# Patient Record
Sex: FEMALE | Race: WHITE | NOT HISPANIC OR LATINO | Employment: FULL TIME | URBAN - METROPOLITAN AREA
[De-identification: names, ages, dates, MRNs, and addresses within clinical notes are randomized per-mention and may not be internally consistent; named-entity substitution may affect disease eponyms.]

---

## 2019-04-05 ENCOUNTER — HOSPITAL ENCOUNTER (OUTPATIENT)
Dept: OTHER | Facility: HOSPITAL | Age: 29
Discharge: HOME | End: 2019-04-05

## 2019-04-05 LAB
ALBUMIN SERPL-MCNC: 4.7 G/DL (ref 3.5–5)
ALBUMIN/GLOB SERPL: 1.3 {RATIO} (ref 1.4–2.6)
ALP SERPL-CCNC: 287 U/L (ref 42–98)
ALT SERPL-CCNC: 36 U/L (ref 10–40)
ANION GAP SERPL CALC-SCNC: 18 MMOL/L (ref 8–19)
AST SERPL-CCNC: 29 U/L (ref 15–50)
BASOPHILS # BLD AUTO: 0.04 10*3/UL (ref 0–0.2)
BASOPHILS NFR BLD AUTO: 0.8 % (ref 0–3)
BILIRUB SERPL-MCNC: 0.56 MG/DL (ref 0.2–1.3)
BUN SERPL-MCNC: 18 MG/DL (ref 5–25)
BUN/CREAT SERPL: 28 {RATIO} (ref 6–20)
CALCIUM SERPL-MCNC: 10 MG/DL (ref 8.7–10.4)
CHLORIDE SERPL-SCNC: 102 MMOL/L (ref 99–111)
CONV ABS IMM GRAN: 0.01 10*3/UL (ref 0–0.2)
CONV CO2: 26 MMOL/L (ref 22–32)
CONV IMMATURE GRAN: 0.2 % (ref 0–1.8)
CONV TOTAL PROTEIN: 8.4 G/DL (ref 6.3–8.2)
CREAT UR-MCNC: 0.65 MG/DL (ref 0.5–0.9)
DEPRECATED RDW RBC AUTO: 48.6 FL (ref 36.4–46.3)
EOSINOPHIL # BLD AUTO: 0.1 10*3/UL (ref 0–0.7)
EOSINOPHIL # BLD AUTO: 1.9 % (ref 0–7)
ERYTHROCYTE [DISTWIDTH] IN BLOOD BY AUTOMATED COUNT: 14.3 % (ref 11.7–14.4)
GFR SERPLBLD BASED ON 1.73 SQ M-ARVRAT: >60 ML/MIN/{1.73_M2}
GLOBULIN UR ELPH-MCNC: 3.7 G/DL (ref 2–3.5)
GLUCOSE SERPL-MCNC: 100 MG/DL (ref 65–99)
HBA1C MFR BLD: 11.5 G/DL (ref 12–16)
HCT VFR BLD AUTO: 37.1 % (ref 37–47)
LYMPHOCYTES # BLD AUTO: 1.17 10*3/UL (ref 1–5)
MCH RBC QN AUTO: 28.5 PG (ref 27–31)
MCHC RBC AUTO-ENTMCNC: 31 G/DL (ref 33–37)
MCV RBC AUTO: 91.8 FL (ref 81–99)
MONOCYTES # BLD AUTO: 0.46 10*3/UL (ref 0.2–1.2)
MONOCYTES NFR BLD AUTO: 8.8 % (ref 3–10)
NEUTROPHILS # BLD AUTO: 3.46 10*3/UL (ref 2–8)
NEUTROPHILS NFR BLD AUTO: 66 % (ref 30–85)
NRBC CBCN: 0 % (ref 0–0.7)
OSMOLALITY SERPL CALC.SUM OF ELEC: 296 MOSM/KG (ref 273–304)
PLATELET # BLD AUTO: 373 10*3/UL (ref 130–400)
PMV BLD AUTO: 9.1 FL (ref 9.4–12.3)
POTASSIUM SERPL-SCNC: 4.1 MMOL/L (ref 3.5–5.3)
RBC # BLD AUTO: 4.04 10*6/UL (ref 4.2–5.4)
SODIUM SERPL-SCNC: 142 MMOL/L (ref 135–147)
VARIANT LYMPHS NFR BLD MANUAL: 22.3 % (ref 20–45)
WBC # BLD AUTO: 5.24 10*3/UL (ref 4.8–10.8)

## 2019-07-30 ENCOUNTER — HOSPITAL ENCOUNTER (OUTPATIENT)
Dept: FAMILY MEDICINE CLINIC | Facility: CLINIC | Age: 29
Discharge: HOME | End: 2019-07-30
Attending: NURSE PRACTITIONER

## 2019-07-30 ENCOUNTER — OFFICE VISIT CONVERTED (OUTPATIENT)
Dept: FAMILY MEDICINE CLINIC | Facility: CLINIC | Age: 29
End: 2019-07-30
Attending: NURSE PRACTITIONER

## 2019-07-30 ENCOUNTER — HOSPITAL ENCOUNTER (OUTPATIENT)
Dept: GENERAL RADIOLOGY | Facility: HOSPITAL | Age: 29
Discharge: HOME | End: 2019-07-30
Attending: NURSE PRACTITIONER

## 2019-07-30 LAB
25(OH)D3 SERPL-MCNC: 22 NG/ML (ref 30–100)
ALBUMIN SERPL-MCNC: 4.6 G/DL (ref 3.5–5)
ALBUMIN/GLOB SERPL: 1.1 {RATIO} (ref 1.4–2.6)
ALP SERPL-CCNC: 286 U/L (ref 42–98)
ALT SERPL-CCNC: 42 U/L (ref 10–40)
ANION GAP SERPL CALC-SCNC: 24 MMOL/L (ref 8–19)
AST SERPL-CCNC: 37 U/L (ref 15–50)
BASOPHILS # BLD AUTO: 0.05 10*3/UL (ref 0–0.2)
BASOPHILS NFR BLD AUTO: 0.7 % (ref 0–3)
BILIRUB SERPL-MCNC: 0.67 MG/DL (ref 0.2–1.3)
BUN SERPL-MCNC: 13 MG/DL (ref 5–25)
BUN/CREAT SERPL: 20 {RATIO} (ref 6–20)
CALCIUM SERPL-MCNC: 10.2 MG/DL (ref 8.7–10.4)
CHLORIDE SERPL-SCNC: 99 MMOL/L (ref 99–111)
CHOLEST SERPL-MCNC: 249 MG/DL (ref 107–200)
CHOLEST/HDLC SERPL: 4.7 {RATIO} (ref 3–6)
CONV ABS IMM GRAN: 0.01 10*3/UL (ref 0–0.2)
CONV CO2: 23 MMOL/L (ref 22–32)
CONV IMMATURE GRAN: 0.1 % (ref 0–1.8)
CONV TOTAL PROTEIN: 8.7 G/DL (ref 6.3–8.2)
CREAT UR-MCNC: 0.64 MG/DL (ref 0.5–0.9)
DEPRECATED RDW RBC AUTO: 46.7 FL (ref 36.4–46.3)
EOSINOPHIL # BLD AUTO: 0.11 10*3/UL (ref 0–0.7)
EOSINOPHIL # BLD AUTO: 1.6 % (ref 0–7)
ERYTHROCYTE [DISTWIDTH] IN BLOOD BY AUTOMATED COUNT: 13.8 % (ref 11.7–14.4)
FOLATE SERPL-MCNC: >20 NG/ML (ref 4.8–20)
GFR SERPLBLD BASED ON 1.73 SQ M-ARVRAT: >60 ML/MIN/{1.73_M2}
GLOBULIN UR ELPH-MCNC: 4.1 G/DL (ref 2–3.5)
GLUCOSE SERPL-MCNC: 92 MG/DL (ref 65–99)
HBA1C MFR BLD: 11.8 G/DL (ref 12–16)
HCT VFR BLD AUTO: 37.9 % (ref 37–47)
HDLC SERPL-MCNC: 53 MG/DL (ref 40–60)
IRON SATN MFR SERPL: 26 % (ref 20–55)
IRON SERPL-MCNC: 109 UG/DL (ref 60–170)
LDLC SERPL CALC-MCNC: 161 MG/DL (ref 70–100)
LYMPHOCYTES # BLD AUTO: 1.51 10*3/UL (ref 1–5)
MCH RBC QN AUTO: 28.7 PG (ref 27–31)
MCHC RBC AUTO-ENTMCNC: 31.1 G/DL (ref 33–37)
MCV RBC AUTO: 92.2 FL (ref 81–99)
MONOCYTES # BLD AUTO: 0.43 10*3/UL (ref 0.2–1.2)
MONOCYTES NFR BLD AUTO: 6.4 % (ref 3–10)
NEUTROPHILS # BLD AUTO: 4.56 10*3/UL (ref 2–8)
NEUTROPHILS NFR BLD AUTO: 68.6 % (ref 30–85)
NRBC CBCN: 0 % (ref 0–0.7)
OSMOLALITY SERPL CALC.SUM OF ELEC: 294 MOSM/KG (ref 273–304)
PLATELET # BLD AUTO: 366 10*3/UL (ref 130–400)
PMV BLD AUTO: 9.6 FL (ref 9.4–12.3)
POTASSIUM SERPL-SCNC: 4.1 MMOL/L (ref 3.5–5.3)
RBC # BLD AUTO: 4.11 10*6/UL (ref 4.2–5.4)
SODIUM SERPL-SCNC: 142 MMOL/L (ref 135–147)
TIBC SERPL-MCNC: 419 UG/DL (ref 245–450)
TRANSFERRIN SERPL-MCNC: 293 MG/DL (ref 250–380)
TRIGL SERPL-MCNC: 173 MG/DL (ref 40–150)
TSH SERPL-ACNC: 2.82 M[IU]/L (ref 0.27–4.2)
VARIANT LYMPHS NFR BLD MANUAL: 22.6 % (ref 20–45)
VIT B12 SERPL-MCNC: 438 PG/ML (ref 211–911)
VLDLC SERPL-MCNC: 35 MG/DL (ref 5–37)
WBC # BLD AUTO: 6.67 10*3/UL (ref 4.8–10.8)

## 2019-08-14 ENCOUNTER — OFFICE VISIT CONVERTED (OUTPATIENT)
Dept: FAMILY MEDICINE CLINIC | Facility: CLINIC | Age: 29
End: 2019-08-14
Attending: NURSE PRACTITIONER

## 2019-08-14 ENCOUNTER — HOSPITAL ENCOUNTER (OUTPATIENT)
Dept: FAMILY MEDICINE CLINIC | Facility: CLINIC | Age: 29
Discharge: HOME | End: 2019-08-14
Attending: NURSE PRACTITIONER

## 2019-08-15 ENCOUNTER — HOSPITAL ENCOUNTER (OUTPATIENT)
Dept: PHYSICAL THERAPY | Facility: CLINIC | Age: 29
Setting detail: RECURRING SERIES
Discharge: HOME | End: 2019-09-27
Attending: NURSE PRACTITIONER

## 2019-08-16 LAB
CONV LAST MENSTURAL PERIOD: NORMAL
SPECIMEN SOURCE: NORMAL
SPECIMEN SOURCE: NORMAL
THIN PREP CVX: NORMAL

## 2019-08-20 ENCOUNTER — HOSPITAL ENCOUNTER (OUTPATIENT)
Dept: MRI IMAGING | Facility: HOSPITAL | Age: 29
Discharge: HOME | End: 2019-08-20
Attending: NURSE PRACTITIONER

## 2019-10-17 ENCOUNTER — HOSPITAL ENCOUNTER (OUTPATIENT)
Dept: PHYSICAL THERAPY | Facility: CLINIC | Age: 29
Setting detail: RECURRING SERIES
Discharge: HOME | End: 2020-01-22
Attending: ORTHOPAEDIC SURGERY

## 2020-06-08 ENCOUNTER — HOSPITAL ENCOUNTER (OUTPATIENT)
Dept: PHYSICAL THERAPY | Facility: CLINIC | Age: 30
Setting detail: RECURRING SERIES
Discharge: STILL ADMITTED OR ADMINISTRATIVE | End: 2020-09-16
Attending: ORTHOPAEDIC SURGERY

## 2020-07-06 ENCOUNTER — HOSPITAL ENCOUNTER (OUTPATIENT)
Dept: OTHER | Facility: HOSPITAL | Age: 30
Discharge: HOME | End: 2020-07-06

## 2020-07-08 LAB
CONV MUMPS ANTIBODY IGG: <9 AU/ML
HBV SURFACE AB SER QL: REACTIVE
MEV IGG SER IA-ACNC: <13.5 AU/ML
RUBV IGG SER-ACNC: 2.88 [IU]/ML
VZV IGG SER IA-ACNC: 332 INDEX

## 2020-09-21 ENCOUNTER — HOSPITAL ENCOUNTER (OUTPATIENT)
Dept: PHYSICAL THERAPY | Facility: CLINIC | Age: 30
Setting detail: RECURRING SERIES
Discharge: HOME | End: 2020-11-03
Attending: ORTHOPAEDIC SURGERY

## 2021-05-15 VITALS
TEMPERATURE: 98.4 F | DIASTOLIC BLOOD PRESSURE: 80 MMHG | SYSTOLIC BLOOD PRESSURE: 123 MMHG | OXYGEN SATURATION: 100 % | HEART RATE: 91 BPM | BODY MASS INDEX: 22.68 KG/M2 | RESPIRATION RATE: 18 BRPM | HEIGHT: 63 IN | WEIGHT: 128 LBS

## 2021-05-15 VITALS
HEIGHT: 63 IN | RESPIRATION RATE: 18 BRPM | OXYGEN SATURATION: 100 % | HEART RATE: 66 BPM | SYSTOLIC BLOOD PRESSURE: 105 MMHG | BODY MASS INDEX: 22.68 KG/M2 | WEIGHT: 128 LBS | TEMPERATURE: 97.6 F | DIASTOLIC BLOOD PRESSURE: 75 MMHG

## 2022-01-06 ENCOUNTER — OFFICE VISIT (OUTPATIENT)
Dept: FAMILY MEDICINE CLINIC | Facility: CLINIC | Age: 32
End: 2022-01-06

## 2022-01-06 VITALS
BODY MASS INDEX: 23.74 KG/M2 | SYSTOLIC BLOOD PRESSURE: 134 MMHG | WEIGHT: 134 LBS | DIASTOLIC BLOOD PRESSURE: 80 MMHG | HEIGHT: 63 IN | HEART RATE: 90 BPM | TEMPERATURE: 98.6 F | OXYGEN SATURATION: 99 %

## 2022-01-06 DIAGNOSIS — Z00.00 ANNUAL PHYSICAL EXAM: ICD-10-CM

## 2022-01-06 DIAGNOSIS — E78.2 MIXED HYPERLIPIDEMIA: ICD-10-CM

## 2022-01-06 DIAGNOSIS — R73.01 IMPAIRED FASTING GLUCOSE: Primary | ICD-10-CM

## 2022-01-06 DIAGNOSIS — Z13.29 SCREENING FOR THYROID DISORDER: ICD-10-CM

## 2022-01-06 PROBLEM — C81.90 HODGKIN LYMPHOMA: Status: ACTIVE | Noted: 2022-01-06

## 2022-01-06 LAB
ALBUMIN SERPL-MCNC: 5 G/DL (ref 3.5–5.2)
ALBUMIN/GLOB SERPL: 1.6 G/DL
ALP SERPL-CCNC: 235 U/L (ref 39–117)
ALT SERPL W P-5'-P-CCNC: 62 U/L (ref 1–33)
ANION GAP SERPL CALCULATED.3IONS-SCNC: 10.5 MMOL/L (ref 5–15)
AST SERPL-CCNC: 42 U/L (ref 1–32)
BASOPHILS # BLD AUTO: 0.06 10*3/MM3 (ref 0–0.2)
BASOPHILS NFR BLD AUTO: 0.5 % (ref 0–1.5)
BILIRUB SERPL-MCNC: 0.2 MG/DL (ref 0–1.2)
BUN SERPL-MCNC: 14 MG/DL (ref 6–20)
BUN/CREAT SERPL: 18.7 (ref 7–25)
CALCIUM SPEC-SCNC: 9.9 MG/DL (ref 8.6–10.5)
CHLORIDE SERPL-SCNC: 100 MMOL/L (ref 98–107)
CHOLEST SERPL-MCNC: 244 MG/DL (ref 0–200)
CO2 SERPL-SCNC: 26.5 MMOL/L (ref 22–29)
CREAT SERPL-MCNC: 0.75 MG/DL (ref 0.57–1)
DEPRECATED RDW RBC AUTO: 45.4 FL (ref 37–54)
EOSINOPHIL # BLD AUTO: 0.08 10*3/MM3 (ref 0–0.4)
EOSINOPHIL NFR BLD AUTO: 0.7 % (ref 0.3–6.2)
ERYTHROCYTE [DISTWIDTH] IN BLOOD BY AUTOMATED COUNT: 13.2 % (ref 12.3–15.4)
GFR SERPL CREATININE-BSD FRML MDRD: 90 ML/MIN/1.73
GLOBULIN UR ELPH-MCNC: 3.2 GM/DL
GLUCOSE SERPL-MCNC: 101 MG/DL (ref 65–99)
HBA1C MFR BLD: 5.69 % (ref 4.8–5.6)
HCT VFR BLD AUTO: 39.6 % (ref 34–46.6)
HDLC SERPL-MCNC: 59 MG/DL (ref 40–60)
HGB BLD-MCNC: 12.6 G/DL (ref 12–15.9)
IMM GRANULOCYTES # BLD AUTO: 0.03 10*3/MM3 (ref 0–0.05)
IMM GRANULOCYTES NFR BLD AUTO: 0.3 % (ref 0–0.5)
LDLC SERPL CALC-MCNC: 160 MG/DL (ref 0–100)
LDLC/HDLC SERPL: 2.67 {RATIO}
LYMPHOCYTES # BLD AUTO: 1.61 10*3/MM3 (ref 0.7–3.1)
LYMPHOCYTES NFR BLD AUTO: 13.4 % (ref 19.6–45.3)
MCH RBC QN AUTO: 29.5 PG (ref 26.6–33)
MCHC RBC AUTO-ENTMCNC: 31.8 G/DL (ref 31.5–35.7)
MCV RBC AUTO: 92.7 FL (ref 79–97)
MONOCYTES # BLD AUTO: 0.87 10*3/MM3 (ref 0.1–0.9)
MONOCYTES NFR BLD AUTO: 7.3 % (ref 5–12)
NEUTROPHILS NFR BLD AUTO: 77.8 % (ref 42.7–76)
NEUTROPHILS NFR BLD AUTO: 9.33 10*3/MM3 (ref 1.7–7)
NRBC BLD AUTO-RTO: 0 /100 WBC (ref 0–0.2)
PLATELET # BLD AUTO: 385 10*3/MM3 (ref 140–450)
PMV BLD AUTO: 9.6 FL (ref 6–12)
POTASSIUM SERPL-SCNC: 4.2 MMOL/L (ref 3.5–5.2)
PROT SERPL-MCNC: 8.2 G/DL (ref 6–8.5)
RBC # BLD AUTO: 4.27 10*6/MM3 (ref 3.77–5.28)
SODIUM SERPL-SCNC: 137 MMOL/L (ref 136–145)
TRIGL SERPL-MCNC: 137 MG/DL (ref 0–150)
TSH SERPL DL<=0.05 MIU/L-ACNC: 2.66 UIU/ML (ref 0.27–4.2)
VLDLC SERPL-MCNC: 25 MG/DL (ref 5–40)
WBC NRBC COR # BLD: 11.98 10*3/MM3 (ref 3.4–10.8)

## 2022-01-06 PROCEDURE — 99385 PREV VISIT NEW AGE 18-39: CPT | Performed by: NURSE PRACTITIONER

## 2022-01-06 PROCEDURE — 80061 LIPID PANEL: CPT | Performed by: NURSE PRACTITIONER

## 2022-01-06 PROCEDURE — 80050 GENERAL HEALTH PANEL: CPT | Performed by: NURSE PRACTITIONER

## 2022-01-06 PROCEDURE — 83036 HEMOGLOBIN GLYCOSYLATED A1C: CPT | Performed by: NURSE PRACTITIONER

## 2022-01-06 NOTE — PATIENT INSTRUCTIONS
Immunization Schedule, 27-49 Years Old    Vaccines are usually given at various ages, according to a schedule. Your health care provider will recommend vaccines for you based on your age, medical history, and lifestyle or other factors, such as travel or where you work.  You may receive vaccines as individual doses or as more than one vaccine together in one shot (combination vaccines). Talk with your health care provider about the risks and benefits of combination vaccines.  Recommended immunizations for 27-49 years old  Influenza vaccine  · You should get a dose of the influenza vaccine every year.  Tetanus, diphtheria, and pertussis vaccine  A vaccine that protects against tetanus, diphtheria, and pertussis is known as the Tdap vaccine. A vaccine that protects against tetanus and diphtheria is known as the Td vaccine.  · You should only get the Td vaccine if you have had at least 1 dose of the Tdap vaccine.  · You should get 1 dose of the Td or Tdap vaccine every 10 years, or you should get 1 dose of the Tdap vaccine if:  ? You have not previously gotten a Tdap vaccine.  ? You do not know if you have ever gotten a Tdap vaccine.  ? You are between 27 and 36 weeks pregnant.  Measles, mumps, and rubella vaccine  This is also known as the MMR vaccine. You may need to get the MMR vaccine if:  · You need to catch up on doses you missed in the past.  · You have not been given the vaccine before.  · You do not have evidence of immunity (by a blood test).  Pregnant women should not get the MMR vaccine during pregnancy because it may be harmful to the unborn baby. However, if you are not immune to measles, mumps, or rubella, you should get a dose of MMR vaccine one month or more before pregnancy or within days after delivery.  Varicella vaccine  This is also known as the NILTON vaccine. You may need to get the NILTON vaccine if you were born in 1980 or later and:  · You need to catch up on doses you missed in the past.  · You  have not been given the vaccine before.  · You do not have evidence of immunity (by a blood test).  · You have certain high-risk conditions, such as HIV or AIDS.  Pregnant women should not get the NILTON vaccine during pregnancy because it may be harmful to the unborn baby. However, if you are not immune to chickenpox (varicella), you should get a dose of the NILTON vaccine within days after delivery.  Human papillomavirus vaccine  This is also known as the HPV vaccine. If you have not gotten the vaccine before or you missed doses in the past, talk to your health care provider about whether it is appropriate for you to get the HPV vaccine.  Pneumococcal conjugate vaccine  This is also known as the PCV13 vaccine. You should get the PCV13 vaccine as recommended if you have certain high-risk conditions. These include:  · Diabetes.  · Chronic conditions of the heart, lungs, or liver.  · Conditions that affect the body's disease-fighting system (immune system).  Pneumococcal polysaccharide vaccine  This is also known as the PPSV23 vaccine. You should get the PPSV23 vaccine as recommended if you have certain high-risk conditions. These include:  · Diabetes.  · Chronic conditions of the heart, lungs, or liver.  · Conditions that affect the immune system.  Hepatitis A vaccine  This is also known as the HepA vaccine. If you did not get the HepA vaccine previously, you should get it if:  · You are at risk for a hepatitis A infection. You may be at risk for infection if you:  ? Have chronic liver disease.  ? Have HIV or AIDS.  ? Are a man who has sex with men.  ? Use drugs.  ? Are homeless.  ? May be exposed to hepatitis A through work.  ? Travel to countries where hepatitis A is common.  ? Are pregnant.  ? Have or will have close contact with someone who was adopted from another country.  · You are not at risk for infection but want protection from hepatitis A.  Hepatitis B vaccine  This is also known as the HepB vaccine. If you  did not get the HepB vaccine previously, you should get it if:  · You are at risk for hepatitis B infection. You are at risk if you:  ? Have chronic liver disease.  ? Have HIV or AIDS.  ? Have sex with a partner who has hepatitis B, or:  § You have multiple sex partners.  § You are a man who has sex with men.  ? Use drugs.  ? May be exposed to hepatitis B through work.  ? Live with someone who has hepatitis B.  ? Receive dialysis treatment.  ? Have diabetes.  ? Travel to countries where hepatitis B is common.  ? Are pregnant.  · You are not at risk of infection but want protection from hepatitis B.  Meningococcal conjugate vaccine  This is also known as the MenACWY vaccine. You may need to get the MenACWY vaccine if you:  · Have not been given the vaccine before.  · Need to catch up on doses you missed in the past.  This vaccine is especially important if you:  · Do not have a spleen.  · Have sickle cell disease.  · Have HIV.  · Take medicines that suppress your immune system.  · Travel to countries where meningococcal disease is common.  · Are exposed to Neisseria meningitidis at work.  Serogroup B meningococcal vaccine  This is also known as the MenB vaccine. You may need to get the MenB vaccine if you:  · Have not been given the vaccine before.  · Need to catch up on doses you missed in the past.  This vaccine is especially important if you:  · Do not have a spleen.  · Have sickle cell disease.  · Take medicines that suppress your immune system.  · Are exposed to Neisseria meningitidis at work.  Haemophilus influenzae type b vaccine  This is also known as the Hib vaccine. Anyone older than 5 years of age is usually not given the Hib vaccine. However, if you have certain high-risk conditions, you may need to get this vaccine. These conditions include:  · Not having a spleen.  · Having received a stem cell transplant.  Before you get a vaccine:  Talk with your health care provider about which vaccines are right for  you. This is especially important if:  · You previously had a reaction after getting a vaccine.  · You have a weakened immune system. You may have a weakened immune system if you:  ? Are taking medicines that reduce (suppress) the activity of your immune system.  ? Are taking medicines to treat cancer (chemotherapy).  ? Have HIV or AIDS.  · You work in an environment where you may be exposed to a disease.  · You plan to travel outside of the country.  · You have a chronic illness, such as heart disease, kidney disease, diabetes, or lung disease.  · You are pregnant, think you may be pregnant, or are planning to become pregnant.  Summary  · Before you get a vaccine, tell your health care provider if you have reacted to vaccines in the past or have a condition that weakens your immune system.  · At 27-49 years, you should get a dose of the influenza vaccine every year and a dose of the Td or Tdap vaccine every 10 years.  · Depending on your medical history and your risk factors, you may need other vaccines. Ask your health care provider whether you are up to date on all your vaccines.  · Women who are pregnant may not receive certain vaccines. Ask your health care provider whether you should receive any vaccines soon after you deliver your baby.  This information is not intended to replace advice given to you by your health care provider. Make sure you discuss any questions you have with your health care provider.  Document Revised: 10/14/2020 Document Reviewed: 10/14/2020  Elsevier Patient Education © 2021 [employer]

## 2022-01-06 NOTE — PROGRESS NOTES
Follow Up Office Visit      Patient Name: Laura Hinton  : 1990   MRN: [id number]     Chief Complaint:    Chief Complaint   Patient presents with   • Annual Exam       History of Present Illness: Laura Hinton is a 31 y.o. female who is here today to follow up for hyperlipidemia, allergic rhinitis and impaired fasting glucose    Last eye exam unknown  Last dental exam 4 years prior  Nonsmoker   etoh 2x per week 4 glasses of wine   Exercise 2x per week   LMP not since   After chemotherapy   PAP 2019    Pt is needing paperwork filled out for insurance   Subjective      Review of Systems:   Review of Systems   Constitutional: Negative for chills and fever.   HENT: Positive for postnasal drip. Negative for ear pain, sinus pain and sore throat.    Respiratory: Negative for cough.    Cardiovascular: Negative for chest pain.   Gastrointestinal: Negative for abdominal pain, nausea and vomiting.   Genitourinary: Negative for dysuria.   Musculoskeletal: Negative for myalgias.   Skin: Negative for rash.   Allergic/Immunologic: Positive for environmental allergies.   Neurological: Negative for headaches.        Past Medical History:   Past Medical History:   Diagnosis Date   • Lymphoma in remission (HCC)        Past Surgical History: History reviewed. No pertinent surgical history.    Family History:   Family History   Problem Relation Age of Onset   • Breast cancer Mother    • Cervical cancer Mother        Social History:   Social History     Socioeconomic History   • Marital status:    Tobacco Use   • Smoking status: Never Smoker   • Smokeless tobacco: Never Used   Substance and Sexual Activity   • Alcohol use: Not Currently   • Drug use: Never   • Sexual activity: Yes     Partners: Male       Medications:   No current outpatient medications on file.    Allergies:   No Known Allergies        PHQ-2 Total Score: 0   PHQ-9 Total Score: 0     Objective     Physical Exam:  Vital Signs:   Vitals:     "01/06/22 0737   BP: 134/80   Pulse: 90   Temp: 98.6 °F (37 °C)   SpO2: 99%   Weight: 60.8 kg (134 lb)   Height: 160 cm (63\")     Body mass index is 23.74 kg/m².     Physical Exam  HENT:      Right Ear: Tympanic membrane normal.      Left Ear: Tympanic membrane normal.      Nose: Nose normal.      Mouth/Throat:      Mouth: Mucous membranes are moist.   Eyes:      Pupils: Pupils are equal, round, and reactive to light.   Neck:      Vascular: No carotid bruit.   Cardiovascular:      Rate and Rhythm: Normal rate and regular rhythm.      Heart sounds: Normal heart sounds. No murmur heard.      Pulmonary:      Effort: Pulmonary effort is normal.      Breath sounds: Normal breath sounds.   Abdominal:      General: Bowel sounds are normal.      Palpations: Abdomen is soft.   Musculoskeletal:      Right lower leg: No edema.      Left lower leg: No edema.   Skin:     General: Skin is warm and dry.   Neurological:      Mental Status: She is alert.   Psychiatric:         Mood and Affect: Mood normal.         Behavior: Behavior normal.             Assessment / Plan      Assessment/Plan:   Diagnoses and all orders for this visit:    1. Impaired fasting glucose (Primary)  -     Hemoglobin A1c    2. Mixed hyperlipidemia  -     Comprehensive Metabolic Panel  -     Lipid Panel    3. Annual physical exam  -     CBC Auto Differential    4. Screening for thyroid disorder  -     TSH       Paired fasting glucose obtain hemoglobin A1c recommend exercise 30 minutes daily reduce carb intake  Hyperlipidemia we will obtain lipid panel to monitor  Annual physical exam we will obtain CBC and CMP  Will obtain TSH to screen for thyroid disorder  Schedule PAP     Follow Up:   Return in about 1 year (around 1/6/2023).    Santino Castro, CANDIDO    \"Please note that portions of this note were completed with a voice recognition program.\"    "

## 2022-01-11 ENCOUNTER — TELEPHONE (OUTPATIENT)
Dept: FAMILY MEDICINE CLINIC | Facility: CLINIC | Age: 32
End: 2022-01-11

## 2022-01-11 DIAGNOSIS — D72.829 LEUKOCYTOSIS, UNSPECIFIED TYPE: ICD-10-CM

## 2022-01-11 DIAGNOSIS — R74.8 ELEVATED LIVER ENZYMES: Primary | ICD-10-CM

## 2022-01-11 NOTE — TELEPHONE ENCOUNTER
----- Message from CANDIDO De Jesus sent at 1/10/2022  2:14 PM EST -----  Elevated fasting glucose average glucose elevated at risk for diabetes but does not have diabetesThyroid normalCholesterol remains elevated recommend decrease fried foods red meat and pork products increasing fruits vegetables whole grainsLiver enzymes elevated remains elevated from 1 year prior obtain liver ultrasound avoid alcohol TylenolWhite blood cell count elevated obtain urine with culture

## 2022-02-16 ENCOUNTER — APPOINTMENT (OUTPATIENT)
Dept: ULTRASOUND IMAGING | Facility: HOSPITAL | Age: 32
End: 2022-02-16

## 2022-03-17 ENCOUNTER — TELEPHONE (OUTPATIENT)
Dept: FAMILY MEDICINE CLINIC | Facility: CLINIC | Age: 32
End: 2022-03-17

## 2022-03-17 NOTE — TELEPHONE ENCOUNTER
Caller: Laura Hinton    Relationship to patient: Self    Best call back number: [phone]     Chief complaint: CHEST TIGHTNESS AND PRESSURE    Patient directed to call 911 or go to their nearest emergency room.     Patient verbalized understanding: [x] Yes  [] No      Additional notes: PATIENT CALLED THE OFFICE TO GET AN APPOINTMENT WITH HER PRIMARY CARE PROVIDER. SHE VERBALIZED THAT SHE IS HAVING CHEST TIGHTNESS AND PRESSURE. SHE WAS ADVISED TO GO TO THE ED. PATIENT UNDERSTOOD DIRECTIONS AND WILL GO TO THE ED.

## 2022-03-18 ENCOUNTER — APPOINTMENT (OUTPATIENT)
Dept: GENERAL RADIOLOGY | Facility: HOSPITAL | Age: 32
End: 2022-03-18

## 2022-03-18 ENCOUNTER — HOSPITAL ENCOUNTER (EMERGENCY)
Facility: HOSPITAL | Age: 32
Discharge: HOME | End: 2022-03-18
Attending: EMERGENCY MEDICINE | Admitting: EMERGENCY MEDICINE

## 2022-03-18 VITALS
DIASTOLIC BLOOD PRESSURE: 76 MMHG | TEMPERATURE: 98.3 F | OXYGEN SATURATION: 100 % | RESPIRATION RATE: 16 BRPM | HEIGHT: 62 IN | HEART RATE: 81 BPM | WEIGHT: 135.14 LBS | BODY MASS INDEX: 24.87 KG/M2 | SYSTOLIC BLOOD PRESSURE: 101 MMHG

## 2022-03-18 DIAGNOSIS — R07.89 CHEST PAIN, NON-CARDIAC: Primary | ICD-10-CM

## 2022-03-18 DIAGNOSIS — R07.89 MUSCULOSKELETAL CHEST PAIN: ICD-10-CM

## 2022-03-18 LAB
ALBUMIN SERPL-MCNC: 5 G/DL (ref 3.5–5.2)
ALBUMIN/GLOB SERPL: 1.2 G/DL
ALP SERPL-CCNC: 193 U/L (ref 39–117)
ALT SERPL W P-5'-P-CCNC: 49 U/L (ref 1–33)
ANION GAP SERPL CALCULATED.3IONS-SCNC: 14 MMOL/L (ref 5–15)
AST SERPL-CCNC: 37 U/L (ref 1–32)
BASOPHILS # BLD AUTO: 0.04 10*3/MM3 (ref 0–0.2)
BASOPHILS NFR BLD AUTO: 0.6 % (ref 0–1.5)
BILIRUB SERPL-MCNC: 0.4 MG/DL (ref 0–1.2)
BUN SERPL-MCNC: 17 MG/DL (ref 6–20)
BUN/CREAT SERPL: 23.6 (ref 7–25)
CALCIUM SPEC-SCNC: 10.6 MG/DL (ref 8.6–10.5)
CHLORIDE SERPL-SCNC: 97 MMOL/L (ref 98–107)
CK MB SERPL-CCNC: 1.76 NG/ML
CK SERPL-CCNC: 110 U/L (ref 20–180)
CO2 SERPL-SCNC: 26 MMOL/L (ref 22–29)
CREAT SERPL-MCNC: 0.72 MG/DL (ref 0.57–1)
DEPRECATED RDW RBC AUTO: 47 FL (ref 37–54)
EGFRCR SERPLBLD CKD-EPI 2021: 114.1 ML/MIN/1.73
EOSINOPHIL # BLD AUTO: 0.14 10*3/MM3 (ref 0–0.4)
EOSINOPHIL NFR BLD AUTO: 2 % (ref 0.3–6.2)
ERYTHROCYTE [DISTWIDTH] IN BLOOD BY AUTOMATED COUNT: 13.8 % (ref 12.3–15.4)
GLOBULIN UR ELPH-MCNC: 4.2 GM/DL
GLUCOSE SERPL-MCNC: 107 MG/DL (ref 65–99)
HCT VFR BLD AUTO: 39.7 % (ref 34–46.6)
HGB BLD-MCNC: 13.1 G/DL (ref 12–15.9)
HOLD SPECIMEN: NORMAL
HOLD SPECIMEN: NORMAL
IMM GRANULOCYTES # BLD AUTO: 0.02 10*3/MM3 (ref 0–0.05)
IMM GRANULOCYTES NFR BLD AUTO: 0.3 % (ref 0–0.5)
LIPASE SERPL-CCNC: 32 U/L (ref 13–60)
LYMPHOCYTES # BLD AUTO: 1.95 10*3/MM3 (ref 0.7–3.1)
LYMPHOCYTES NFR BLD AUTO: 27.6 % (ref 19.6–45.3)
MAGNESIUM SERPL-MCNC: 2.3 MG/DL (ref 1.6–2.6)
MCH RBC QN AUTO: 30.4 PG (ref 26.6–33)
MCHC RBC AUTO-ENTMCNC: 33 G/DL (ref 31.5–35.7)
MCV RBC AUTO: 92.1 FL (ref 79–97)
MONOCYTES # BLD AUTO: 0.48 10*3/MM3 (ref 0.1–0.9)
MONOCYTES NFR BLD AUTO: 6.8 % (ref 5–12)
NEUTROPHILS NFR BLD AUTO: 4.43 10*3/MM3 (ref 1.7–7)
NEUTROPHILS NFR BLD AUTO: 62.7 % (ref 42.7–76)
NRBC BLD AUTO-RTO: 0 /100 WBC (ref 0–0.2)
NT-PROBNP SERPL-MCNC: 14.6 PG/ML (ref 0–450)
PLATELET # BLD AUTO: 348 10*3/MM3 (ref 140–450)
PMV BLD AUTO: 9.6 FL (ref 6–12)
POTASSIUM SERPL-SCNC: 3.7 MMOL/L (ref 3.5–5.2)
PROT SERPL-MCNC: 9.2 G/DL (ref 6–8.5)
RBC # BLD AUTO: 4.31 10*6/MM3 (ref 3.77–5.28)
SODIUM SERPL-SCNC: 137 MMOL/L (ref 136–145)
TROPONIN I SERPL-MCNC: 0 NG/ML (ref 0–0.6)
WBC NRBC COR # BLD: 7.06 10*3/MM3 (ref 3.4–10.8)
WHOLE BLOOD HOLD SPECIMEN: NORMAL
WHOLE BLOOD HOLD SPECIMEN: NORMAL

## 2022-03-18 PROCEDURE — 71045 X-RAY EXAM CHEST 1 VIEW: CPT

## 2022-03-18 PROCEDURE — 99283 EMERGENCY DEPT VISIT LOW MDM: CPT

## 2022-03-18 PROCEDURE — 93005 ELECTROCARDIOGRAM TRACING: CPT | Performed by: EMERGENCY MEDICINE

## 2022-03-18 PROCEDURE — 93005 ELECTROCARDIOGRAM TRACING: CPT

## 2022-03-18 PROCEDURE — 82550 ASSAY OF CK (CPK): CPT | Performed by: EMERGENCY MEDICINE

## 2022-03-18 PROCEDURE — 36415 COLL VENOUS BLD VENIPUNCTURE: CPT | Performed by: EMERGENCY MEDICINE

## 2022-03-18 PROCEDURE — 83735 ASSAY OF MAGNESIUM: CPT | Performed by: EMERGENCY MEDICINE

## 2022-03-18 PROCEDURE — 36415 COLL VENOUS BLD VENIPUNCTURE: CPT

## 2022-03-18 PROCEDURE — 82553 CREATINE MB FRACTION: CPT | Performed by: EMERGENCY MEDICINE

## 2022-03-18 PROCEDURE — 84484 ASSAY OF TROPONIN QUANT: CPT

## 2022-03-18 PROCEDURE — 83690 ASSAY OF LIPASE: CPT | Performed by: EMERGENCY MEDICINE

## 2022-03-18 PROCEDURE — 85025 COMPLETE CBC W/AUTO DIFF WBC: CPT | Performed by: EMERGENCY MEDICINE

## 2022-03-18 PROCEDURE — 83880 ASSAY OF NATRIURETIC PEPTIDE: CPT | Performed by: EMERGENCY MEDICINE

## 2022-03-18 PROCEDURE — 80053 COMPREHEN METABOLIC PANEL: CPT | Performed by: EMERGENCY MEDICINE

## 2022-03-18 RX ORDER — ASPIRIN 81 MG/1
324 TABLET, CHEWABLE ORAL ONCE
Status: COMPLETED | OUTPATIENT
Start: 2022-03-18 | End: 2022-03-18

## 2022-03-18 RX ORDER — SODIUM CHLORIDE 0.9 % (FLUSH) 0.9 %
10 SYRINGE (ML) INJECTION AS NEEDED
Status: DISCONTINUED | OUTPATIENT
Start: 2022-03-18 | End: 2022-03-18 | Stop reason: HOSPADM

## 2022-03-18 RX ADMIN — ASPIRIN 81 MG CHEWABLE TABLET 324 MG: 81 TABLET CHEWABLE at 18:24

## 2022-03-18 NOTE — ED PROVIDER NOTES
Subjective   Patient is a 31-year-old female that presents to the emergency department today via POV, accompanied by her significant other, for upper sternal chest pain.  She rates her pain as a 6 on a scale of 0-10 and describes it to be tender to the touch.  Is not reported to radiate anywhere.  Pain is stated to increase with movement and sometimes with deep breathing.  Only thing reported to make it better is rest.  She has no shortness of air, denies cough, denies any recent fever, nausea, vomiting, or diarrhea.      History provided by:  Patient and medical records   used: No        Review of Systems   Constitutional: Negative for chills and fever.   HENT: Negative for congestion, ear pain and sore throat.    Eyes: Negative for pain.   Respiratory: Negative for cough, chest tightness and shortness of breath.    Cardiovascular: Positive for chest pain. Negative for palpitations and leg swelling.        Patient reports chest pain that is worse when palpated.   Gastrointestinal: Negative for abdominal pain, diarrhea, nausea and vomiting.   Genitourinary: Negative for flank pain and hematuria.   Musculoskeletal: Negative for joint swelling.   Skin: Negative for color change and pallor.   Neurological: Negative for dizziness, seizures, light-headedness and headaches.   All other systems reviewed and are negative.      Past Medical History:   Diagnosis Date   • Lymphoma in remission (HCC)        No Known Allergies    Past Surgical History:   Procedure Laterality Date   • TOTAL HIP ARTHROPLASTY Bilateral    • TOTAL SHOULDER REPLACEMENT Right        Family History   Problem Relation Age of Onset   • Breast cancer Mother    • Cervical cancer Mother        Social History     Socioeconomic History   • Marital status:    Tobacco Use   • Smoking status: Former Smoker   • Smokeless tobacco: Never Used   Substance and Sexual Activity   • Alcohol use: Yes   • Drug use: Never   • Sexual activity: Yes      Partners: Male           Objective   Physical Exam  Vitals and nursing note reviewed.   Constitutional:       General: She is not in acute distress.     Appearance: Normal appearance. She is not toxic-appearing.   HENT:      Head: Normocephalic and atraumatic.      Mouth/Throat:      Mouth: Mucous membranes are moist.   Eyes:      General: No scleral icterus.  Cardiovascular:      Rate and Rhythm: Normal rate and regular rhythm.      Pulses: Normal pulses.      Heart sounds: Normal heart sounds. Heart sounds not distant.   Pulmonary:      Effort: Pulmonary effort is normal. No tachypnea, accessory muscle usage or respiratory distress.      Breath sounds: Normal breath sounds. No stridor. No decreased breath sounds, wheezing, rhonchi or rales.   Chest:      Chest wall: Tenderness present. No mass, deformity or crepitus.   Abdominal:      General: Abdomen is flat.      Palpations: Abdomen is soft.      Tenderness: There is no abdominal tenderness.   Musculoskeletal:         General: Normal range of motion.      Cervical back: Normal range of motion and neck supple.      Right lower leg: No edema.      Left lower leg: No edema.   Skin:     General: Skin is warm and dry.      Capillary Refill: Capillary refill takes less than 2 seconds.   Neurological:      Mental Status: She is alert and oriented to person, place, and time. Mental status is at baseline.         Procedures           ED Course                                               HEART Score (for prediction of 6-week risk of major adverse cardiac event) reviewed and/or performed as part of the patient evaluation and treatment planning process.  The result associated with this review/performance is: 0     Medications   aspirin chewable tablet 324 mg (324 mg Oral Given 3/18/22 0175)       MDM  Number of Diagnoses or Management Options  Chest pain, non-cardiac: new and does not require workup  Musculoskeletal chest pain: new and does not require  workup  Diagnosis management comments: Patient is a 31-year-old female that presented to the emergency department with chest wall pain that worsened with patient, movement, and sometimes with deep breathing.  A complete cardiac work-up was completed and was benign for any active cardiac disease that might be responsible for her pain.  Additionally there was no respiratory component appreciated through work-up that might be responsible for her pain.  Her discomfort is likely musculoskeletal.    I have spoke with the patient and I have explained the patient´s condition, diagnoses and treatment plan based on the information available to me at this time. I have answered all questions and addressed any concerns. The patient has a good understanding of the patient´s diagnosis, condition, and treatment plan as can be expected at this point. The vital signs have been stable. The patient´s condition is stable and appropriate for discharge from the emergency department.      The patient will pursue further outpatient evaluation with the primary care physician or other designated or consulting physician as outlined in the discharge instructions. They are agreeable to this plan of care and follow-up instructions have been explained in detail. The patient has received these instructions in written format and have expressed an understanding of the discharge instructions. The patient is aware that any significant change in condition or worsening of symptoms should prompt an immediate return to this or the closest emergency department or call to 911.         Amount and/or Complexity of Data Reviewed  Clinical lab tests: ordered and reviewed  Tests in the radiology section of CPT®: ordered and reviewed  Review and summarize past medical records: yes (I have personally reviewed patient's previous medical encounters.  )    Risk of Complications, Morbidity, and/or Mortality  Presenting problems: moderate  Diagnostic procedures:  low  Management options: low    Patient Progress  Patient progress: stable      Final diagnoses:   Chest pain, non-cardiac   Musculoskeletal chest pain       ED Disposition  ED Disposition     ED Disposition   Discharge    Condition   Stable    Comment   --             Elmo Castro, APRN  [address] [place] [address]  [phone]    In 3 days  As needed, If symptoms worsen         Medication List      No changes were made to your prescriptions during this visit.          Deepti Barker, APRN  03/19/22 0217

## 2022-03-19 LAB — QT INTERVAL: 335 MS

## 2022-03-19 NOTE — DISCHARGE INSTRUCTIONS
Please follow-up with your primary care provider in 2 to 3 days if you continue to have chest wall pain.  All your lab work completed today in the emergency department showed no signs suggesting that your chest pain is related to your heart.  Please continue to take Tylenol or Motrin for your mild discomfort.  You may also try adhering to the RICE acronym for your pain which is described and explained within your discharge papers.  Return to the ER if you develop worsening of chest pain, shortness of air, any episodes of severe sweating, or if you develop severe nausea, vomiting, or diarrhea.

## 2023-03-10 ENCOUNTER — TELEPHONE (OUTPATIENT)
Dept: FAMILY MEDICINE CLINIC | Facility: CLINIC | Age: 33
End: 2023-03-10
Payer: COMMERCIAL

## 2023-03-10 NOTE — TELEPHONE ENCOUNTER
Caller: Laura Hinton    Relationship: Self    Best call back number: [phone]    What orders are you requesting (i.e. lab or imaging): BLOOD WORK     In what timeframe would the patient need to come in: ASAP     Where will you receive your lab/imaging services:   IN OFFICE   Additional notes: PATIENT IS WANTING TO CHECK HER CHOLESTEROL AND A1C. PATIENT DID SAY SHE COULD COME IN FOR AN APPOINTMENT IF NEEDED. PLEASE ADVISE ONCE COMPLETE.

## 2023-03-15 PROBLEM — M87.029: Status: ACTIVE | Noted: 2019-09-18

## 2023-03-15 PROBLEM — M25.559 ARTHRALGIA OF HIP: Status: ACTIVE | Noted: 2023-03-15

## 2023-03-15 PROBLEM — M19.011 ARTHRITIS OF RIGHT SHOULDER REGION: Status: ACTIVE | Noted: 2020-06-02

## 2023-03-15 PROBLEM — Z96.611 S/P SHOULDER REPLACEMENT, RIGHT: Status: ACTIVE | Noted: 2020-06-17

## 2023-03-15 PROBLEM — M87.059 AVASCULAR NECROSIS OF BONE OF HIP: Status: ACTIVE | Noted: 2023-03-15

## 2023-03-16 ENCOUNTER — OFFICE VISIT (OUTPATIENT)
Dept: FAMILY MEDICINE CLINIC | Facility: CLINIC | Age: 33
End: 2023-03-16
Payer: COMMERCIAL

## 2023-03-16 VITALS
TEMPERATURE: 98.2 F | DIASTOLIC BLOOD PRESSURE: 81 MMHG | WEIGHT: 141 LBS | BODY MASS INDEX: 25.95 KG/M2 | HEART RATE: 88 BPM | SYSTOLIC BLOOD PRESSURE: 130 MMHG | HEIGHT: 62 IN | OXYGEN SATURATION: 100 %

## 2023-03-16 DIAGNOSIS — R73.01 IMPAIRED FASTING GLUCOSE: ICD-10-CM

## 2023-03-16 DIAGNOSIS — J30.9 ALLERGIC RHINITIS, UNSPECIFIED SEASONALITY, UNSPECIFIED TRIGGER: ICD-10-CM

## 2023-03-16 DIAGNOSIS — Z13.29 SCREENING FOR THYROID DISORDER: ICD-10-CM

## 2023-03-16 DIAGNOSIS — Z11.59 NEED FOR HEPATITIS C SCREENING TEST: ICD-10-CM

## 2023-03-16 DIAGNOSIS — E78.2 MIXED HYPERLIPIDEMIA: ICD-10-CM

## 2023-03-16 DIAGNOSIS — Z00.00 ANNUAL PHYSICAL EXAM: ICD-10-CM

## 2023-03-16 LAB
ALBUMIN SERPL-MCNC: 4.9 G/DL (ref 3.5–5.2)
ALBUMIN/GLOB SERPL: 1.3 G/DL
ALP SERPL-CCNC: 228 U/L (ref 39–117)
ALT SERPL W P-5'-P-CCNC: 88 U/L (ref 1–33)
ANION GAP SERPL CALCULATED.3IONS-SCNC: 11.8 MMOL/L (ref 5–15)
AST SERPL-CCNC: 49 U/L (ref 1–32)
BASOPHILS # BLD AUTO: 0.04 10*3/MM3 (ref 0–0.2)
BASOPHILS NFR BLD AUTO: 0.5 % (ref 0–1.5)
BILIRUB SERPL-MCNC: 0.2 MG/DL (ref 0–1.2)
BUN SERPL-MCNC: 12 MG/DL (ref 6–20)
BUN/CREAT SERPL: 16.9 (ref 7–25)
CALCIUM SPEC-SCNC: 10.6 MG/DL (ref 8.6–10.5)
CHLORIDE SERPL-SCNC: 101 MMOL/L (ref 98–107)
CHOLEST SERPL-MCNC: 296 MG/DL (ref 0–200)
CO2 SERPL-SCNC: 27.2 MMOL/L (ref 22–29)
CREAT SERPL-MCNC: 0.71 MG/DL (ref 0.57–1)
DEPRECATED RDW RBC AUTO: 42.5 FL (ref 37–54)
EGFRCR SERPLBLD CKD-EPI 2021: 115.3 ML/MIN/1.73
EOSINOPHIL # BLD AUTO: 0.1 10*3/MM3 (ref 0–0.4)
EOSINOPHIL NFR BLD AUTO: 1.3 % (ref 0.3–6.2)
ERYTHROCYTE [DISTWIDTH] IN BLOOD BY AUTOMATED COUNT: 13.1 % (ref 12.3–15.4)
GLOBULIN UR ELPH-MCNC: 3.8 GM/DL
GLUCOSE SERPL-MCNC: 96 MG/DL (ref 65–99)
HBA1C MFR BLD: 6.1 % (ref 4.8–5.6)
HCT VFR BLD AUTO: 37.5 % (ref 34–46.6)
HCV AB SER DONR QL: NORMAL
HDLC SERPL-MCNC: 52 MG/DL (ref 40–60)
HGB BLD-MCNC: 12.8 G/DL (ref 12–15.9)
IMM GRANULOCYTES # BLD AUTO: 0.04 10*3/MM3 (ref 0–0.05)
IMM GRANULOCYTES NFR BLD AUTO: 0.5 % (ref 0–0.5)
LDLC SERPL CALC-MCNC: 196 MG/DL (ref 0–100)
LDLC/HDLC SERPL: 3.74 {RATIO}
LYMPHOCYTES # BLD AUTO: 2.29 10*3/MM3 (ref 0.7–3.1)
LYMPHOCYTES NFR BLD AUTO: 30.5 % (ref 19.6–45.3)
MCH RBC QN AUTO: 30.4 PG (ref 26.6–33)
MCHC RBC AUTO-ENTMCNC: 34.1 G/DL (ref 31.5–35.7)
MCV RBC AUTO: 89.1 FL (ref 79–97)
MONOCYTES # BLD AUTO: 0.46 10*3/MM3 (ref 0.1–0.9)
MONOCYTES NFR BLD AUTO: 6.1 % (ref 5–12)
NEUTROPHILS NFR BLD AUTO: 4.57 10*3/MM3 (ref 1.7–7)
NEUTROPHILS NFR BLD AUTO: 61.1 % (ref 42.7–76)
NRBC BLD AUTO-RTO: 0 /100 WBC (ref 0–0.2)
PLATELET # BLD AUTO: 339 10*3/MM3 (ref 140–450)
PMV BLD AUTO: 9.4 FL (ref 6–12)
POTASSIUM SERPL-SCNC: 4 MMOL/L (ref 3.5–5.2)
PROT SERPL-MCNC: 8.7 G/DL (ref 6–8.5)
RBC # BLD AUTO: 4.21 10*6/MM3 (ref 3.77–5.28)
SODIUM SERPL-SCNC: 140 MMOL/L (ref 136–145)
T4 FREE SERPL-MCNC: 1.03 NG/DL (ref 0.93–1.7)
TRIGL SERPL-MCNC: 247 MG/DL (ref 0–150)
TSH SERPL DL<=0.05 MIU/L-ACNC: 1.93 UIU/ML (ref 0.27–4.2)
VLDLC SERPL-MCNC: 48 MG/DL (ref 5–40)
WBC NRBC COR # BLD: 7.5 10*3/MM3 (ref 3.4–10.8)

## 2023-03-16 PROCEDURE — 99385 PREV VISIT NEW AGE 18-39: CPT | Performed by: NURSE PRACTITIONER

## 2023-03-16 PROCEDURE — 80061 LIPID PANEL: CPT | Performed by: NURSE PRACTITIONER

## 2023-03-16 PROCEDURE — 86803 HEPATITIS C AB TEST: CPT | Performed by: NURSE PRACTITIONER

## 2023-03-16 PROCEDURE — 83036 HEMOGLOBIN GLYCOSYLATED A1C: CPT | Performed by: NURSE PRACTITIONER

## 2023-03-16 PROCEDURE — 36415 COLL VENOUS BLD VENIPUNCTURE: CPT | Performed by: NURSE PRACTITIONER

## 2023-03-16 PROCEDURE — 80050 GENERAL HEALTH PANEL: CPT | Performed by: NURSE PRACTITIONER

## 2023-03-16 PROCEDURE — 84439 ASSAY OF FREE THYROXINE: CPT | Performed by: NURSE PRACTITIONER

## 2023-03-16 NOTE — PROGRESS NOTES
Follow Up Office Visit      Patient Name: Laura Hinton  : 1990   MRN: [id number]     Chief Complaint:    Chief Complaint   Patient presents with   • Allergic Rhinitis   • Hyperlipidemia   • impaired fasting glucose   • Annual Exam       History of Present Illness: Laura Hinton is a 32 y.o. female who is here today to follow up for annual exam   Follow [facility], hyperlipidemia, allergic rhinitis.    Lab-3/2022  Pap-2019  lmp- Due to chemo  and radiation she hasn't had a cycle in a long time.     Last eye exam unknown  Last dental exam   Nonsmoker  etoh wine on occasionally   Exercising 3x gym for an hour     Subjective      Review of Systems:   Review of Systems   Constitutional: Negative for fever.   HENT: Negative for ear pain and sore throat.    Respiratory: Negative for cough.    Cardiovascular: Negative for chest pain.   Gastrointestinal: Negative for abdominal pain, constipation, nausea and vomiting.   Genitourinary: Negative for dysuria.   Skin: Negative for rash.   Neurological: Negative for headaches.        Past Medical History:   Past Medical History:   Diagnosis Date   • Lymphoma in remission (HCC)        Past Surgical History:   Past Surgical History:   Procedure Laterality Date   • TOTAL HIP ARTHROPLASTY Bilateral    • TOTAL SHOULDER REPLACEMENT Right        Family History:   Family History   Problem Relation Age of Onset   • Breast cancer Mother    • Cervical cancer Mother        Social History:   Social History     Socioeconomic History   • Marital status:    Tobacco Use   • Smoking status: Former   • Smokeless tobacco: Never   Substance and Sexual Activity   • Alcohol use: Yes   • Drug use: Never   • Sexual activity: Yes     Partners: Male       Medications:   No current outpatient medications on file.    Allergies:   No Known Allergies        Objective     Physical Exam:  Vital Signs:   Vitals:    23 1529   BP: 130/81   Pulse: 88   Temp: 98.2 °F (36.8 °C)   SpO2:  "100%   Weight: 64 kg (141 lb)   Height: 157.5 cm (62\")     Body mass index is 25.79 kg/m².     Physical Exam  HENT:      Right Ear: Tympanic membrane normal.      Left Ear: Tympanic membrane normal.      Nose: Nose normal.      Mouth/Throat:      Mouth: Mucous membranes are moist.   Eyes:      Conjunctiva/sclera: Conjunctivae normal.   Neck:      Vascular: No carotid bruit.   Cardiovascular:      Rate and Rhythm: Normal rate and regular rhythm.      Heart sounds: Normal heart sounds. No murmur heard.  Pulmonary:      Effort: Pulmonary effort is normal.      Breath sounds: Normal breath sounds.   Abdominal:      General: Bowel sounds are normal.      Palpations: Abdomen is soft.   Musculoskeletal:      Right lower leg: No edema.      Left lower leg: No edema.   Lymphadenopathy:      Cervical: No cervical adenopathy.   Skin:     General: Skin is warm and dry.   Neurological:      Mental Status: She is alert.   Psychiatric:         Mood and Affect: Mood normal.         Behavior: Behavior normal.             Assessment / Plan      Assessment/Plan:   Diagnoses and all orders for this visit:    1. Annual physical exam    2. Impaired fasting glucose  -     Comprehensive Metabolic Panel  -     Hemoglobin A1c  -     Urinalysis With Culture If Indicated -    3. Mixed hyperlipidemia  -     Lipid Panel    4. Allergic rhinitis, unspecified seasonality, unspecified trigger  -     CBC Auto Differential    5. Need for hepatitis C screening test  -     Hepatitis C Antibody    6. Screening for thyroid disorder  -     TSH  -     T4, Free       Annual physical exam Pap smear will be scheduled discussed diet exercise routine dental and eye exams discussed recommendations for screening mammograms colonoscopies and Pap smears  Impaired fasting glucose obtain hemoglobin A1c to monitor recommend use carb intake exercise 30 minutes daily increase water intake  Hyperlipidemia we will obtain lipid panel to monitor recommend use fried foods red " "meat pork products cheese increase fruits vegetables whole grains  Seasonal allergies taking OTC antihistamine as needed  We will obtain thyroid labs to screen for thyroid disorder        Follow Up:   Return in about 1 year (around 3/16/2024) for Pap with next visit.    Santino Castro, APRISABELLE    \"Please note that portions of this note were completed with a voice recognition program.\"    "

## 2023-03-17 DIAGNOSIS — J30.9 ALLERGIC RHINITIS, UNSPECIFIED SEASONALITY, UNSPECIFIED TRIGGER: ICD-10-CM

## 2023-03-17 DIAGNOSIS — R74.8 ELEVATED LIVER ENZYMES: Primary | ICD-10-CM

## 2023-03-17 DIAGNOSIS — R73.01 IMPAIRED FASTING GLUCOSE: ICD-10-CM

## 2023-03-17 DIAGNOSIS — Z00.00 ANNUAL PHYSICAL EXAM: ICD-10-CM

## 2023-03-17 DIAGNOSIS — Z13.29 SCREENING FOR THYROID DISORDER: ICD-10-CM

## 2023-03-17 DIAGNOSIS — Z11.59 NEED FOR HEPATITIS C SCREENING TEST: ICD-10-CM

## 2023-03-17 DIAGNOSIS — E78.2 MIXED HYPERLIPIDEMIA: ICD-10-CM

## 2023-03-17 LAB
BACTERIA UR QL AUTO: ABNORMAL /HPF
BILIRUB UR QL STRIP: NEGATIVE
CLARITY UR: CLEAR
COLOR UR: YELLOW
GLUCOSE UR STRIP-MCNC: NEGATIVE MG/DL
HGB UR QL STRIP.AUTO: NEGATIVE
HYALINE CASTS UR QL AUTO: ABNORMAL /LPF
KETONES UR QL STRIP: NEGATIVE
LEUKOCYTE ESTERASE UR QL STRIP.AUTO: ABNORMAL
NITRITE UR QL STRIP: NEGATIVE
PH UR STRIP.AUTO: 5.5 [PH] (ref 5–8)
PROT UR QL STRIP: NEGATIVE
RBC # UR STRIP: ABNORMAL /HPF
REF LAB TEST METHOD: ABNORMAL
SP GR UR STRIP: 1.01 (ref 1–1.03)
SQUAMOUS #/AREA URNS HPF: ABNORMAL /HPF
UROBILINOGEN UR QL STRIP: ABNORMAL
WBC # UR STRIP: ABNORMAL /HPF

## 2023-03-17 PROCEDURE — 81001 URINALYSIS AUTO W/SCOPE: CPT | Performed by: NURSE PRACTITIONER

## 2023-03-23 ENCOUNTER — OFFICE VISIT (OUTPATIENT)
Dept: FAMILY MEDICINE CLINIC | Facility: CLINIC | Age: 33
End: 2023-03-23
Payer: COMMERCIAL

## 2023-03-23 VITALS
HEIGHT: 62 IN | WEIGHT: 138 LBS | BODY MASS INDEX: 25.4 KG/M2 | HEART RATE: 88 BPM | SYSTOLIC BLOOD PRESSURE: 114 MMHG | OXYGEN SATURATION: 98 % | DIASTOLIC BLOOD PRESSURE: 81 MMHG | TEMPERATURE: 97.7 F

## 2023-03-23 DIAGNOSIS — E83.52 HYPERCALCEMIA: ICD-10-CM

## 2023-03-23 DIAGNOSIS — N89.8 VAGINAL DISCHARGE: ICD-10-CM

## 2023-03-23 DIAGNOSIS — Z12.4 SCREENING FOR MALIGNANT NEOPLASM OF CERVIX: ICD-10-CM

## 2023-03-23 DIAGNOSIS — J30.9 ALLERGIC RHINITIS, UNSPECIFIED SEASONALITY, UNSPECIFIED TRIGGER: ICD-10-CM

## 2023-03-23 DIAGNOSIS — R73.01 IMPAIRED FASTING GLUCOSE: ICD-10-CM

## 2023-03-23 DIAGNOSIS — E78.2 MIXED HYPERLIPIDEMIA: ICD-10-CM

## 2023-03-23 DIAGNOSIS — H65.02 NON-RECURRENT ACUTE SEROUS OTITIS MEDIA OF LEFT EAR: ICD-10-CM

## 2023-03-23 DIAGNOSIS — Z01.419 ENCOUNTER FOR ANNUAL ROUTINE GYNECOLOGICAL EXAMINATION: ICD-10-CM

## 2023-03-23 LAB
CANDIDA SPECIES: NEGATIVE
GARDNERELLA VAGINALIS: POSITIVE
T VAGINALIS DNA VAG QL PROBE+SIG AMP: NEGATIVE

## 2023-03-23 PROCEDURE — 87480 CANDIDA DNA DIR PROBE: CPT | Performed by: NURSE PRACTITIONER

## 2023-03-23 PROCEDURE — 87660 TRICHOMONAS VAGIN DIR PROBE: CPT | Performed by: NURSE PRACTITIONER

## 2023-03-23 PROCEDURE — 87624 HPV HI-RISK TYP POOLED RSLT: CPT | Performed by: NURSE PRACTITIONER

## 2023-03-23 PROCEDURE — 87510 GARDNER VAG DNA DIR PROBE: CPT | Performed by: NURSE PRACTITIONER

## 2023-03-23 PROCEDURE — G0123 SCREEN CERV/VAG THIN LAYER: HCPCS | Performed by: NURSE PRACTITIONER

## 2023-03-23 RX ORDER — FLUTICASONE PROPIONATE 50 MCG
2 SPRAY, SUSPENSION (ML) NASAL DAILY
Qty: 18.2 ML | Refills: 1 | Status: SHIPPED | OUTPATIENT
Start: 2023-03-23 | End: 2023-03-23 | Stop reason: SDUPTHER

## 2023-03-23 RX ORDER — AMOXICILLIN 500 MG/1
500 TABLET, FILM COATED ORAL 3 TIMES DAILY
Qty: 30 TABLET | Refills: 0 | Status: SHIPPED | OUTPATIENT
Start: 2023-03-23 | End: 2023-03-23 | Stop reason: SDUPTHER

## 2023-03-23 RX ORDER — LORATADINE 10 MG/1
10 TABLET ORAL DAILY
Qty: 90 TABLET | Refills: 1 | Status: SHIPPED | OUTPATIENT
Start: 2023-03-23

## 2023-03-23 RX ORDER — FLUTICASONE PROPIONATE 50 MCG
2 SPRAY, SUSPENSION (ML) NASAL DAILY
Qty: 18.2 ML | Refills: 1 | Status: SHIPPED | OUTPATIENT
Start: 2023-03-23

## 2023-03-23 RX ORDER — LORATADINE 10 MG/1
10 TABLET ORAL DAILY
Qty: 90 TABLET | Refills: 1 | Status: SHIPPED | OUTPATIENT
Start: 2023-03-23 | End: 2023-03-23 | Stop reason: SDUPTHER

## 2023-03-23 RX ORDER — PREDNISONE 20 MG/1
20 TABLET ORAL DAILY
Qty: 10 TABLET | Refills: 0 | Status: SHIPPED | OUTPATIENT
Start: 2023-03-23 | End: 2023-03-23 | Stop reason: SDUPTHER

## 2023-03-23 RX ORDER — PREDNISONE 20 MG/1
20 TABLET ORAL DAILY
Qty: 10 TABLET | Refills: 0 | Status: SHIPPED | OUTPATIENT
Start: 2023-03-23 | End: 2023-04-02

## 2023-03-23 RX ORDER — AMOXICILLIN 500 MG/1
500 TABLET, FILM COATED ORAL 3 TIMES DAILY
Qty: 30 TABLET | Refills: 0 | Status: SHIPPED | OUTPATIENT
Start: 2023-03-23 | End: 2023-04-02

## 2023-03-23 NOTE — PROGRESS NOTES
Female Physical / PAP Note      Patient Name: Laura Hinton  : 1990   MRN: [id number]     Chief Complaint:    Chief Complaint   Patient presents with   • Gynecologic Exam       History of Present Illness: Laura Hinton is a 32 y.o. female who is here today for her annual health maintenance and physical.   Review lab results     Pap-2019  lmp- abnormal due to previous chemotherapy and radiation    C/o she would like to discuss her recent lab results      Pt c/o pnd, nasal discharge, feels lymph nodes swelling in neck for a few days with a headache for a few days  No fever  Denies cough     Subjective      Review of Systems:   Review of Systems   Constitutional: Negative for fever.   HENT: Positive for postnasal drip, rhinorrhea and sinus pressure. Negative for ear pain and sore throat.    Eyes: Negative for discharge.   Respiratory: Negative for cough.    Cardiovascular: Negative for chest pain.   Gastrointestinal: Negative for abdominal pain, diarrhea, nausea and vomiting.   Genitourinary: Negative for dysuria, menstrual problem and pelvic pain.   Skin: Negative for rash.   Allergic/Immunologic: Positive for environmental allergies.   Neurological: Positive for headaches.      Breast - No tenderness, lumps, discharge, or blood from nipples.      Past Medical History, Social History, Family History and Care Team were all reviewed with patient and updated as appropriate.     Medications:     Current Outpatient Medications:   •  amoxicillin (AMOXIL) 500 MG tablet, Take 1 tablet by mouth 3 (Three) Times a Day for 10 days., Disp: 30 tablet, Rfl: 0  •  fluticasone (FLONASE) 50 MCG/ACT nasal spray, 2 sprays into the nostril(s) as directed by provider Daily. 2 sprays each nostril daily, Disp: 18.2 mL, Rfl: 1  •  loratadine (CLARITIN) 10 MG tablet, Take 1 tablet by mouth Daily., Disp: 90 tablet, Rfl: 1  •  predniSONE (DELTASONE) 20 MG tablet, Take 1 tablet by mouth Daily for 10 days., Disp: 10 tablet, Rfl:  "0    Allergies:   No Known Allergies      Objective     Physical Exam:  Vital Signs:   Vitals:    03/23/23 0835   BP: 114/81   Pulse: 88   Temp: 97.7 °F (36.5 °C)   SpO2: 98%   Weight: 62.6 kg (138 lb)   Height: 157.5 cm (62\")     Body mass index is 25.24 kg/m².     Physical Exam  HENT:      Right Ear: A middle ear effusion is present. Tympanic membrane is erythematous.      Left Ear: A middle ear effusion is present.      Nose: Congestion and rhinorrhea present.      Mouth/Throat:      Mouth: Mucous membranes are moist.      Comments: PND  Eyes:      Conjunctiva/sclera:      Right eye: Right conjunctiva is injected.      Left eye: Left conjunctiva is injected.   Cardiovascular:      Rate and Rhythm: Normal rate and regular rhythm.      Heart sounds: Normal heart sounds. No murmur heard.  Pulmonary:      Effort: Pulmonary effort is normal.      Breath sounds: Normal breath sounds.   Abdominal:      General: Bowel sounds are normal.      Palpations: Abdomen is soft.   Genitourinary:     General: Normal vulva.      Vagina: Vaginal discharge present.      Cervix: Normal.      Uterus: Normal.       Adnexa: Right adnexa normal and left adnexa normal.      Rectum: Normal.   Musculoskeletal:      Right lower leg: No edema.      Left lower leg: No edema.   Lymphadenopathy:      Cervical: No cervical adenopathy.   Skin:     General: Skin is warm and dry.   Neurological:      Mental Status: She is alert.             Assessment / Plan      Assessment/Plan:   Diagnoses and all orders for this visit:    1. Encounter for annual routine gynecological examination    2. Screening for malignant neoplasm of cervix    3. Mixed hyperlipidemia    4. Hypercalcemia  -     PTH, Intact; Future    5. Impaired fasting glucose  -     Hemoglobin A1c; Future    6. Vaginal discharge  -     Gardnerella vaginalis, Trichomonas vaginalis, Candida albicans, DNA - Swab, Vagina    7. Allergic rhinitis, unspecified seasonality, unspecified trigger    8. " Non-recurrent acute serous otitis media of left ear    Other orders  -     Discontinue: fluticasone (FLONASE) 50 MCG/ACT nasal spray; 2 sprays into the nostril(s) as directed by provider Daily. 2 sprays each nostril daily  Dispense: 18.2 mL; Refill: 1  -     Discontinue: amoxicillin (AMOXIL) 500 MG tablet; Take 1 tablet by mouth 3 (Three) Times a Day for 10 days.  Dispense: 30 tablet; Refill: 0  -     Discontinue: loratadine (CLARITIN) 10 MG tablet; Take 1 tablet by mouth Daily.  Dispense: 90 tablet; Refill: 1  -     Discontinue: predniSONE (DELTASONE) 20 MG tablet; Take 1 tablet by mouth Daily for 10 days.  Dispense: 10 tablet; Refill: 0  -     loratadine (CLARITIN) 10 MG tablet; Take 1 tablet by mouth Daily.  Dispense: 90 tablet; Refill: 1  -     fluticasone (FLONASE) 50 MCG/ACT nasal spray; 2 sprays into the nostril(s) as directed by provider Daily. 2 sprays each nostril daily  Dispense: 18.2 mL; Refill: 1  -     predniSONE (DELTASONE) 20 MG tablet; Take 1 tablet by mouth Daily for 10 days.  Dispense: 10 tablet; Refill: 0  -     amoxicillin (AMOXIL) 500 MG tablet; Take 1 tablet by mouth 3 (Three) Times a Day for 10 days.  Dispense: 30 tablet; Refill: 0       Encounter for woman wellness exam Pap smear collected to screen for cervical cancer  Hyperlipidemia recommend to decrease fried foods red meat pork products cheese increase fruits vegetables whole grains and exercise 30 minutes daily  Hypercalcemia avoid any calcium supplements increase water intake we will recheck with a parathyroid hormone  Impaired fasting glucose recommend decrease carb intake exercise 30 minutes daily increase lean protein intake increase water intake we will reassess in 90 days  Vaginal discharge obtain vaginal screen we will call with results and further recommendations  Seasonal allergies uncontrolled we will add Flonase to Claritin and provide a prescription for prednisone acute otitis media of ear we will treat with  "amoxicillin        Follow Up:   Return in about 3 months (around 6/23/2023).    Healthcare Maintenance:   Counseling provided on screening mammogram, screening colonoscopy, diet and exercise .   Laura Hinton voices understanding and acceptance of this advice and will call back with any further questions or concerns. AVS with preventive healthcare tips printed for patient.     Elmo Castro, APRN    \"Please note that portions of this note were completed with a voice recognition program.\"    "

## 2023-03-24 RX ORDER — METRONIDAZOLE 500 MG/1
500 TABLET ORAL 2 TIMES DAILY
Qty: 14 TABLET | Refills: 0 | Status: SHIPPED | OUTPATIENT
Start: 2023-03-24

## 2023-03-30 LAB
CYTOLOGIST CVX/VAG CYTO: ABNORMAL
CYTOLOGY CVX/VAG DOC CYTO: ABNORMAL
CYTOLOGY CVX/VAG DOC THIN PREP: ABNORMAL
DX ICD CODE: ABNORMAL
HIV 1 & 2 AB SER-IMP: ABNORMAL
HPV I/H RISK 4 DNA CVX QL PROBE+SIG AMP: POSITIVE
OTHER STN SPEC: ABNORMAL
STAT OF ADQ CVX/VAG CYTO-IMP: ABNORMAL

## 2023-04-04 ENCOUNTER — TELEPHONE (OUTPATIENT)
Dept: FAMILY MEDICINE CLINIC | Facility: CLINIC | Age: 33
End: 2023-04-04
Payer: COMMERCIAL

## 2023-04-04 NOTE — TELEPHONE ENCOUNTER
patient called in asking about positive lab results, told patient to monitor for any breakouts, her pap was normal, and to continue to get her yearly screenings.    She was positive for HPV

## 2023-05-25 ENCOUNTER — OFFICE VISIT (OUTPATIENT)
Dept: FAMILY MEDICINE CLINIC | Facility: CLINIC | Age: 33
End: 2023-05-25
Payer: COMMERCIAL

## 2023-05-25 VITALS
DIASTOLIC BLOOD PRESSURE: 90 MMHG | WEIGHT: 142 LBS | BODY MASS INDEX: 26.13 KG/M2 | HEIGHT: 62 IN | OXYGEN SATURATION: 98 % | SYSTOLIC BLOOD PRESSURE: 138 MMHG | TEMPERATURE: 97.8 F | HEART RATE: 89 BPM

## 2023-05-25 DIAGNOSIS — F41.9 ANXIETY: ICD-10-CM

## 2023-05-25 DIAGNOSIS — L65.9 HAIR LOSS: Primary | ICD-10-CM

## 2023-05-25 DIAGNOSIS — F43.9 STRESS: ICD-10-CM

## 2023-05-25 DIAGNOSIS — R53.83 FATIGUE, UNSPECIFIED TYPE: ICD-10-CM

## 2023-05-25 DIAGNOSIS — R03.0 ELEVATED BLOOD PRESSURE READING: ICD-10-CM

## 2023-05-25 LAB
25(OH)D3 SERPL-MCNC: 15.5 NG/ML (ref 30–100)
BASOPHILS # BLD AUTO: 0.02 10*3/MM3 (ref 0–0.2)
BASOPHILS NFR BLD AUTO: 0.3 % (ref 0–1.5)
DEPRECATED RDW RBC AUTO: 43.8 FL (ref 37–54)
EOSINOPHIL # BLD AUTO: 0.12 10*3/MM3 (ref 0–0.4)
EOSINOPHIL NFR BLD AUTO: 1.9 % (ref 0.3–6.2)
ERYTHROCYTE [DISTWIDTH] IN BLOOD BY AUTOMATED COUNT: 13.6 % (ref 12.3–15.4)
FERRITIN SERPL-MCNC: 251 NG/ML (ref 13–150)
HCT VFR BLD AUTO: 37.9 % (ref 34–46.6)
HGB BLD-MCNC: 12.8 G/DL (ref 12–15.9)
IMM GRANULOCYTES # BLD AUTO: 0.02 10*3/MM3 (ref 0–0.05)
IMM GRANULOCYTES NFR BLD AUTO: 0.3 % (ref 0–0.5)
IRON 24H UR-MRATE: 72 MCG/DL (ref 37–145)
IRON SATN MFR SERPL: 14 % (ref 20–50)
LYMPHOCYTES # BLD AUTO: 2.3 10*3/MM3 (ref 0.7–3.1)
LYMPHOCYTES NFR BLD AUTO: 36.4 % (ref 19.6–45.3)
MCH RBC QN AUTO: 29.6 PG (ref 26.6–33)
MCHC RBC AUTO-ENTMCNC: 33.8 G/DL (ref 31.5–35.7)
MCV RBC AUTO: 87.5 FL (ref 79–97)
MONOCYTES # BLD AUTO: 0.49 10*3/MM3 (ref 0.1–0.9)
MONOCYTES NFR BLD AUTO: 7.8 % (ref 5–12)
NEUTROPHILS NFR BLD AUTO: 3.37 10*3/MM3 (ref 1.7–7)
NEUTROPHILS NFR BLD AUTO: 53.3 % (ref 42.7–76)
NRBC BLD AUTO-RTO: 0 /100 WBC (ref 0–0.2)
PLATELET # BLD AUTO: 340 10*3/MM3 (ref 140–450)
PMV BLD AUTO: 9.5 FL (ref 6–12)
RBC # BLD AUTO: 4.33 10*6/MM3 (ref 3.77–5.28)
T4 FREE SERPL-MCNC: 1.06 NG/DL (ref 0.93–1.7)
TIBC SERPL-MCNC: 505 MCG/DL (ref 298–536)
TRANSFERRIN SERPL-MCNC: 339 MG/DL (ref 200–360)
TSH SERPL DL<=0.05 MIU/L-ACNC: 2.12 UIU/ML (ref 0.27–4.2)
VIT B12 BLD-MCNC: 559 PG/ML (ref 211–946)
WBC NRBC COR # BLD: 6.32 10*3/MM3 (ref 3.4–10.8)

## 2023-05-25 PROCEDURE — 82728 ASSAY OF FERRITIN: CPT | Performed by: NURSE PRACTITIONER

## 2023-05-25 PROCEDURE — 85025 COMPLETE CBC W/AUTO DIFF WBC: CPT | Performed by: NURSE PRACTITIONER

## 2023-05-25 PROCEDURE — 84439 ASSAY OF FREE THYROXINE: CPT | Performed by: NURSE PRACTITIONER

## 2023-05-25 PROCEDURE — 82607 VITAMIN B-12: CPT | Performed by: NURSE PRACTITIONER

## 2023-05-25 PROCEDURE — 82306 VITAMIN D 25 HYDROXY: CPT | Performed by: NURSE PRACTITIONER

## 2023-05-25 PROCEDURE — 84443 ASSAY THYROID STIM HORMONE: CPT | Performed by: NURSE PRACTITIONER

## 2023-05-25 PROCEDURE — 84466 ASSAY OF TRANSFERRIN: CPT | Performed by: NURSE PRACTITIONER

## 2023-05-25 PROCEDURE — 83540 ASSAY OF IRON: CPT | Performed by: NURSE PRACTITIONER

## 2023-05-25 NOTE — PROGRESS NOTES
ACUTE VISIT     Patient Name: Laura Hinton  : 1990   MRN: [id number]     Chief Complaint:    Chief Complaint   Patient presents with   • Alopecia       History of Present Illness: Laura Hinton is a 32 y.o. female who is here today for sudden hair loss.      She said she noticed  Yesterday on the back right side of her scalp she has a bald patch.   She doesn't really know how long it has been there.   She denies any shampoo or conditioner changes.  She denies any scalp itching or pain.    Pt c/o feelings stressed daily more in the past 4-6 months, home, relationship, work, feeling overwhelmed all the time   3x per week exercise for one hour       Subjective      Review of Systems:   Review of Systems   Constitutional: Negative for fever.   Respiratory: Negative for cough.    Cardiovascular: Negative for chest pain.   Skin: Negative for rash.        Hair loss        Past Medical History:   Past Medical History:   Diagnosis Date   • Lymphoma in remission        Past Surgical History:   Past Surgical History:   Procedure Laterality Date   • TOTAL HIP ARTHROPLASTY Bilateral    • TOTAL SHOULDER REPLACEMENT Right        Family History:   Family History   Problem Relation Age of Onset   • Breast cancer Mother    • Cervical cancer Mother        Social History:   Social History     Socioeconomic History   • Marital status:    Tobacco Use   • Smoking status: Former   • Smokeless tobacco: Never   Substance and Sexual Activity   • Alcohol use: Yes   • Drug use: Never   • Sexual activity: Yes     Partners: Male       Medications:     Current Outpatient Medications:   •  fluticasone (FLONASE) 50 MCG/ACT nasal spray, 2 sprays into the nostril(s) as directed by provider Daily. 2 sprays each nostril daily, Disp: 18.2 mL, Rfl: 1  •  loratadine (CLARITIN) 10 MG tablet, Take 1 tablet by mouth Daily., Disp: 90 tablet, Rfl: 1  •  metroNIDAZOLE (Flagyl) 500 MG tablet, Take 1 tablet by mouth 2 (Two) Times a Day.  "(Patient not taking: Reported on 5/25/2023), Disp: 14 tablet, Rfl: 0    Allergies:   No Known Allergies      Objective     Physical Exam:  Vital Signs:   Vitals:    05/25/23 1521 05/25/23 1555   BP: (!) 161/107 138/90   Pulse: 89    Temp: 97.8 °F (36.6 °C)    SpO2: 98%    Weight: 64.4 kg (142 lb)    Height: 157.5 cm (62\")      Body mass index is 25.97 kg/m².     Physical Exam  Neck:      Vascular: No carotid bruit.   Cardiovascular:      Rate and Rhythm: Normal rate and regular rhythm.      Heart sounds: Normal heart sounds. No murmur heard.  Pulmonary:      Effort: Pulmonary effort is normal.      Breath sounds: Normal breath sounds.   Musculoskeletal:      Right lower leg: No edema.      Left lower leg: No edema.   Skin:     General: Skin is warm and dry.   Neurological:      Mental Status: She is alert.             Assessment / Plan      Assessment/Plan:   Diagnoses and all orders for this visit:    1. Hair loss (Primary)  -     Iron Profile  -     Ferritin  -     Vitamin B12  -     Vitamin D,25-Hydroxy  -     TSH  -     T4, Free  -     CBC Auto Differential    2. Stress    3. Anxiety    4. Elevated blood pressure reading    5. Fatigue, unspecified type  -     Iron Profile  -     Ferritin  -     Vitamin B12  -     Vitamin D,25-Hydroxy  -     TSH  -     T4, Free  -     CBC Auto Differential       Hair loss fatigue will obtain labs recommend increase water intake reduce caloric intake weight loss increase exercise to 30 minutes daily  Stress anxiety discussed medications treatment patient declines at this time would like to increase her exercise if symptoms persist recommend counseling  Elevated blood pressure reading Reca reduce salt intake increase water intake blood pressure check in 2 to 4 weeks exercise 30 minutes daily and weight loss      Follow Up:   Return if symptoms worsen or fail to improve.    CANDIDO Jaimes      Please note that portions of this note were completed with a voice recognition " program.

## 2023-05-26 ENCOUNTER — TELEPHONE (OUTPATIENT)
Dept: FAMILY MEDICINE CLINIC | Facility: CLINIC | Age: 33
End: 2023-05-26
Payer: COMMERCIAL

## 2023-05-26 RX ORDER — LANOLIN ALCOHOL/MO/W.PET/CERES
325 CREAM (GRAM) TOPICAL
Qty: 90 TABLET | Refills: 1 | Status: SHIPPED | OUTPATIENT
Start: 2023-05-26

## 2023-05-26 RX ORDER — MULTIVIT WITH MINERALS/LUTEIN
250 TABLET ORAL DAILY
Qty: 90 TABLET | Refills: 1 | Status: SHIPPED | OUTPATIENT
Start: 2023-05-26

## 2023-05-26 NOTE — TELEPHONE ENCOUNTER
Patient reviewed her labs on mychart and saw her Ferritin was elevated (251). She wants to know if she should be concerned or anything she needs to take for it? Thank you.

## 2024-09-25 ENCOUNTER — OFFICE VISIT (OUTPATIENT)
Dept: FAMILY MEDICINE CLINIC | Facility: CLINIC | Age: 34
End: 2024-09-25
Payer: COMMERCIAL

## 2024-09-25 VITALS
DIASTOLIC BLOOD PRESSURE: 85 MMHG | WEIGHT: 131.8 LBS | HEIGHT: 62 IN | TEMPERATURE: 97.8 F | HEART RATE: 75 BPM | BODY MASS INDEX: 24.25 KG/M2 | SYSTOLIC BLOOD PRESSURE: 129 MMHG | OXYGEN SATURATION: 99 %

## 2024-09-25 DIAGNOSIS — H69.93 EUSTACHIAN TUBE DISORDER, BILATERAL: ICD-10-CM

## 2024-09-25 DIAGNOSIS — J30.9 ALLERGIC RHINITIS, UNSPECIFIED SEASONALITY, UNSPECIFIED TRIGGER: ICD-10-CM

## 2024-09-25 DIAGNOSIS — H65.02 NON-RECURRENT ACUTE SEROUS OTITIS MEDIA OF LEFT EAR: ICD-10-CM

## 2024-09-25 DIAGNOSIS — H92.09 EARACHE: Primary | ICD-10-CM

## 2024-09-25 PROCEDURE — 99213 OFFICE O/P EST LOW 20 MIN: CPT | Performed by: NURSE PRACTITIONER

## 2024-09-25 PROCEDURE — 96372 THER/PROPH/DIAG INJ SC/IM: CPT | Performed by: NURSE PRACTITIONER

## 2024-09-25 RX ORDER — LORATADINE 10 MG/1
10 TABLET ORAL DAILY
Qty: 90 TABLET | Refills: 1 | Status: SHIPPED | OUTPATIENT
Start: 2024-09-25

## 2024-09-25 RX ORDER — FLUTICASONE PROPIONATE 50 UG/1
2 SPRAY, METERED NASAL DAILY
Qty: 18.2 ML | Refills: 1 | Status: SHIPPED | OUTPATIENT
Start: 2024-09-25

## 2024-09-25 RX ORDER — CEFDINIR 300 MG/1
300 CAPSULE ORAL 2 TIMES DAILY
Qty: 20 CAPSULE | Refills: 0 | Status: SHIPPED | OUTPATIENT
Start: 2024-09-25 | End: 2024-10-05

## 2024-09-25 RX ORDER — METHYLPREDNISOLONE ACETATE 80 MG/ML
80 INJECTION, SUSPENSION INTRA-ARTICULAR; INTRALESIONAL; INTRAMUSCULAR; SOFT TISSUE ONCE
Status: COMPLETED | OUTPATIENT
Start: 2024-09-25 | End: 2024-09-25

## 2024-09-25 RX ADMIN — METHYLPREDNISOLONE ACETATE 80 MG: 80 INJECTION, SUSPENSION INTRA-ARTICULAR; INTRALESIONAL; INTRAMUSCULAR; SOFT TISSUE at 10:19

## 2025-01-02 NOTE — PROGRESS NOTES
Patient Name: Laura Hinton  : 1990   MRN: [id number]     Chief Complaint:mass in front of right ear         History of Present Illness: Laura Hinton is a 34 y.o. female who is here today for mass in front of right ear       This came up 10 day go. Patient reports no trauma to the area.  The mass is hard and not painful. No hearing loss  Patient has not had these in the past.  Also sinus congestion and pain on right side with ear pressure         Subjective      Review of Systems:   Review of Systems   Constitutional:  Negative for fever.   HENT:  Positive for congestion, ear pain and sinus pressure.    Eyes:  Negative for discharge.   Respiratory:  Negative for cough.    Cardiovascular:  Negative for chest pain.   Neurological:  Negative for dizziness and headaches.        Past Medical History:   Past Medical History:   Diagnosis Date    Lymphoma in remission        Past Surgical History:   Past Surgical History:   Procedure Laterality Date    TOTAL HIP ARTHROPLASTY Bilateral     TOTAL SHOULDER REPLACEMENT Right        Family History:   Family History   Problem Relation Age of Onset    Breast cancer Mother     Cervical cancer Mother        Social History:   Social History     Socioeconomic History    Marital status:    Tobacco Use    Smoking status: Former     Current packs/day: 0.00     Average packs/day: 0.3 packs/day for 12.0 years (3.0 ttl pk-yrs)     Types: Cigarettes     Start date:      Quit date: 2020     Years since quittin.0    Smokeless tobacco: Never   Vaping Use    Vaping status: Never Used   Substance and Sexual Activity    Alcohol use: Yes    Drug use: Never    Sexual activity: Yes     Partners: Male       Medications:     Current Outpatient Medications:     Cholecalciferol 50 MCG (2000 UT) tablet, Take 1 tablet by mouth Daily., Disp: 90 each, Rfl: 1    ferrous sulfate 325 (65 FE) MG EC tablet, Take 1 tablet by mouth Daily With Breakfast., Disp: 90 tablet, Rfl: 1     "fluticasone (FLONASE) 50 MCG/ACT nasal spray, Administer 2 sprays into the nostril(s) as directed by provider Daily. 2 sprays each nostril daily, Disp: 16 g, Rfl: 1    loratadine (CLARITIN) 10 MG tablet, Take 1 tablet by mouth Daily., Disp: 90 tablet, Rfl: 1    vitamin C (ASCORBIC ACID) 250 MG tablet, Take 1 tablet by mouth Daily., Disp: 90 tablet, Rfl: 1    amoxicillin-clavulanate (AUGMENTIN) 875-125 MG per tablet, Take 1 tablet by mouth 2 (Two) Times a Day for 10 days., Disp: 20 tablet, Rfl: 0  No current facility-administered medications for this visit.    Allergies:   No Active Allergies        Objective     Physical Exam:  Vital Signs:   Vitals:    01/14/25 0913   BP: 139/79   Pulse: 78   Temp: 97.8 °F (36.6 °C)   SpO2: 98%   Weight: 60.3 kg (133 lb)   Height: 157.5 cm (62\")     Body mass index is 24.33 kg/m².          Physical Exam  HENT:      Head:      Comments: Preauricle lymph node enlarged right side      Right Ear: A middle ear effusion is present. Tympanic membrane is erythematous.      Left Ear: Tympanic membrane normal.      Nose: Congestion and rhinorrhea present.      Mouth/Throat:      Mouth: Mucous membranes are moist.   Eyes:      Conjunctiva/sclera: Conjunctivae normal.   Cardiovascular:      Rate and Rhythm: Normal rate and regular rhythm.      Heart sounds: Normal heart sounds. No murmur heard.  Pulmonary:      Effort: Pulmonary effort is normal.      Breath sounds: Normal breath sounds.   Neurological:      Mental Status: She is alert.           Assessment / Plan      Assessment/Plan:   Diagnoses and all orders for this visit:    1. Acute non-recurrent maxillary sinusitis (Primary)  -     methylPREDNISolone acetate (DEPO-medrol) injection 80 mg    2. Non-recurrent acute serous otitis media of right ear    3. Lymphadenopathy    4. Allergic rhinitis, unspecified seasonality, unspecified trigger    Other orders  -     amoxicillin-clavulanate (AUGMENTIN) 875-125 MG per tablet; Take 1 tablet by " "mouth 2 (Two) Times a Day for 10 days.  Dispense: 20 tablet; Refill: 0  -     fluticasone (FLONASE) 50 MCG/ACT nasal spray; Administer 2 sprays into the nostril(s) as directed by provider Daily. 2 sprays each nostril daily  Dispense: 16 g; Refill: 1         Acute sinusitis acute otitis media will treat with amoxicillin with eustachian tube disorder will start Flonase lymphadenopathy will monitor follow-up in 2 weeks      Follow Up:   Return in about 2 weeks (around 1/28/2025).    Santino Castro, APRN    \"Please note that portions of this note were completed with a voice recognition program.\"     "

## 2025-01-14 ENCOUNTER — OFFICE VISIT (OUTPATIENT)
Dept: FAMILY MEDICINE CLINIC | Facility: CLINIC | Age: 35
End: 2025-01-14
Payer: COMMERCIAL

## 2025-01-14 VITALS
DIASTOLIC BLOOD PRESSURE: 79 MMHG | HEIGHT: 62 IN | TEMPERATURE: 97.8 F | WEIGHT: 133 LBS | SYSTOLIC BLOOD PRESSURE: 139 MMHG | OXYGEN SATURATION: 98 % | HEART RATE: 78 BPM | BODY MASS INDEX: 24.48 KG/M2

## 2025-01-14 DIAGNOSIS — H65.01 NON-RECURRENT ACUTE SEROUS OTITIS MEDIA OF RIGHT EAR: ICD-10-CM

## 2025-01-14 DIAGNOSIS — J01.00 ACUTE NON-RECURRENT MAXILLARY SINUSITIS: Primary | ICD-10-CM

## 2025-01-14 DIAGNOSIS — J30.9 ALLERGIC RHINITIS, UNSPECIFIED SEASONALITY, UNSPECIFIED TRIGGER: ICD-10-CM

## 2025-01-14 DIAGNOSIS — R59.1 LYMPHADENOPATHY: ICD-10-CM

## 2025-01-14 PROCEDURE — 96372 THER/PROPH/DIAG INJ SC/IM: CPT | Performed by: NURSE PRACTITIONER

## 2025-01-14 PROCEDURE — 99213 OFFICE O/P EST LOW 20 MIN: CPT | Performed by: NURSE PRACTITIONER

## 2025-01-14 RX ORDER — METHYLPREDNISOLONE ACETATE 80 MG/ML
80 INJECTION, SUSPENSION INTRA-ARTICULAR; INTRALESIONAL; INTRAMUSCULAR; SOFT TISSUE ONCE
Status: COMPLETED | OUTPATIENT
Start: 2025-01-14 | End: 2025-01-14

## 2025-01-14 RX ORDER — FLUTICASONE PROPIONATE 50 MCG
2 SPRAY, SUSPENSION (ML) NASAL DAILY
Qty: 16 G | Refills: 1 | Status: SHIPPED | OUTPATIENT
Start: 2025-01-14

## 2025-01-14 RX ADMIN — METHYLPREDNISOLONE ACETATE 80 MG: 80 INJECTION, SUSPENSION INTRA-ARTICULAR; INTRALESIONAL; INTRAMUSCULAR; SOFT TISSUE at 10:33

## 2025-02-03 NOTE — PROGRESS NOTES
Female Physical / PAP Note      Patient Name: Laura Hinton  : 1990   MRN: [id number]     Chief Complaint:    Chief Complaint   Patient presents with    Gynecologic Exam       History of Present Illness: Laura Hinton is a 34 y.o. female who is here today for her annual health maintenance and physical.     hyperlipidemia, impaired fasting glucose, anemia iron deficiency, and vitamin d deficiency    Patient states her axiety she has had stress from work and home   Working 2 jobs recently, typically going to gym helps her but has not gone to the gym in the past 2 months feels this is the reason why her blood pressure is elevated     Labs-2024  Pap- 3/2023  LMP , dx with lymphoma in   Last PET scan  with oncologist     FH maternal aunt breast cancer dx in her 50's, maternal cousin breast cancer in her 20's     Mass in front of right ear for one month       Subjective      Review of Systems:   Review of Systems   Constitutional:  Negative for fever.   Respiratory:  Negative for cough.    Cardiovascular:  Negative for chest pain.   Gastrointestinal:  Negative for abdominal pain.   Genitourinary:  Negative for dysuria.   Musculoskeletal:  Negative for myalgias.      Breast - No tenderness, lumps, discharge, or blood from nipples.      Past Medical History, Social History, Family History and Care Team were all reviewed with patient and updated as appropriate.     Medications:     Current Outpatient Medications:     fluticasone (FLONASE) 50 MCG/ACT nasal spray, Administer 2 sprays into the nostril(s) as directed by provider Daily. 2 sprays each nostril daily, Disp: 16 g, Rfl: 1    loratadine (CLARITIN) 10 MG tablet, Take 1 tablet by mouth Daily., Disp: 90 tablet, Rfl: 1    Allergies:   No Known Allergies            Objective     Physical Exam:  Vital Signs:   Vitals:    25 1545 25 1614   BP: 151/92 142/71   Pulse: 99    Temp: 98.1 °F (36.7 °C)    SpO2: 100%    Weight: 62.2 kg (137 lb  "3.2 oz)    Height: 157.5 cm (62\")    PainSc: 0-No pain      Body mass index is 25.09 kg/m².   BMI is >= 25 and <30. (Overweight) The following options were offered after discussion;: exercise counseling/recommendations and nutrition counseling/recommendations       Physical Exam  HENT:      Head: Mass present.        Comments: Mass lateral right face preauricle area     Right Ear: Tympanic membrane normal.      Left Ear: Tympanic membrane normal.      Nose: Nose normal.      Mouth/Throat:      Mouth: Mucous membranes are moist.   Eyes:      Conjunctiva/sclera: Conjunctivae normal.   Cardiovascular:      Rate and Rhythm: Normal rate and regular rhythm.      Heart sounds: Normal heart sounds. No murmur heard.  Pulmonary:      Effort: Pulmonary effort is normal.      Breath sounds: Normal breath sounds.   Abdominal:      General: Bowel sounds are normal.      Palpations: Abdomen is soft.   Genitourinary:     General: Normal vulva.      Vagina: Normal.      Cervix: Normal.      Uterus: Normal.       Adnexa: Right adnexa normal and left adnexa normal.      Rectum: Normal.   Musculoskeletal:      Right lower leg: No edema.      Left lower leg: No edema.   Skin:     General: Skin is warm and dry.   Neurological:      Mental Status: She is alert.   Psychiatric:         Mood and Affect: Mood normal.         Behavior: Behavior normal.             Assessment / Plan      Assessment/Plan:   Diagnoses and all orders for this visit:    1. Elevated blood pressure reading (Primary)    2. Encounter for well woman exam with routine gynecological exam    3. Screening for malignant neoplasm of cervix  -     IgP, Aptima HPV    4. Mixed hyperlipidemia  -     Lipid Panel; Future  -     Lipid Panel    5. Impaired fasting glucose  -     Comprehensive Metabolic Panel  -     Hemoglobin A1c    6. Anemia, unspecified type  -     Iron Profile  -     Ferritin  -     CBC Auto Differential  -     Vitamin B12    7. Vitamin D deficiency  -     Vitamin " "D,25-Hydroxy    8. Vaginal discharge  -     Gardnerella vaginalis, Trichomonas vaginalis, Candida albicans, DNA - Swab, Vagina; Future  -     Gardnerella vaginalis, Trichomonas vaginalis, Candida albicans, DNA - Swab, Vagina    9. Facial mass  -     US Head Neck Soft Tissue; Future    10. Screening for thyroid disorder  -     TSH; Future  -     T4; Future  -     TSH  -     T4  -     TSH  -     T4, Free       Routine gynecological exam pelvic exam completed Pap cervix collected to screen for cervical cancer  Vaginal discharge vaginal screen collected  Hyperlipidemia obtain lipid   Impaired fasting glucose obtain hemoglobin A1c to monitor  Anemia will obtain labs to monitor no current supplementation at this time  Vitamin D deficiency will obtain labs to monitor no current supplementation  Facial mass will obtain ultrasound soft tissue call with results and further recommendations  Elevated blood pressure reading recommend monitoring at home follow-up in 2 weeks for manual blood pressure check recommend resuming exercise reducing salt intake increasing water intake      Follow Up:   Return in about 1 year (around 2/7/2026).    Healthcare Maintenance:   Counseling provided on screening mammogram screening colonoscopy diet exercise  Laura webers understanding and acceptance of this advice and will call back with any further questions or concerns. AVS with preventive healthcare tips printed for patient.     Elmo Castro, APRN    \"Please note that portions of this note were completed with a voice recognition program.\"    "

## 2025-02-07 ENCOUNTER — OFFICE VISIT (OUTPATIENT)
Dept: FAMILY MEDICINE CLINIC | Facility: CLINIC | Age: 35
End: 2025-02-07
Payer: COMMERCIAL

## 2025-02-07 VITALS
DIASTOLIC BLOOD PRESSURE: 71 MMHG | TEMPERATURE: 98.1 F | SYSTOLIC BLOOD PRESSURE: 142 MMHG | HEIGHT: 62 IN | HEART RATE: 99 BPM | OXYGEN SATURATION: 100 % | WEIGHT: 137.2 LBS | BODY MASS INDEX: 25.25 KG/M2

## 2025-02-07 DIAGNOSIS — N89.8 VAGINAL DISCHARGE: ICD-10-CM

## 2025-02-07 DIAGNOSIS — R73.01 IMPAIRED FASTING GLUCOSE: ICD-10-CM

## 2025-02-07 DIAGNOSIS — Z12.4 SCREENING FOR MALIGNANT NEOPLASM OF CERVIX: ICD-10-CM

## 2025-02-07 DIAGNOSIS — D64.9 ANEMIA, UNSPECIFIED TYPE: ICD-10-CM

## 2025-02-07 DIAGNOSIS — E78.2 MIXED HYPERLIPIDEMIA: ICD-10-CM

## 2025-02-07 DIAGNOSIS — Z13.29 SCREENING FOR THYROID DISORDER: ICD-10-CM

## 2025-02-07 DIAGNOSIS — R03.0 ELEVATED BLOOD PRESSURE READING: Primary | ICD-10-CM

## 2025-02-07 DIAGNOSIS — R22.0 FACIAL MASS: ICD-10-CM

## 2025-02-07 DIAGNOSIS — E55.9 VITAMIN D DEFICIENCY: ICD-10-CM

## 2025-02-07 DIAGNOSIS — Z01.419 ENCOUNTER FOR WELL WOMAN EXAM WITH ROUTINE GYNECOLOGICAL EXAM: ICD-10-CM

## 2025-02-07 LAB
25(OH)D3 SERPL-MCNC: 23.1 NG/ML (ref 30–100)
ALBUMIN SERPL-MCNC: 4.6 G/DL (ref 3.5–5.2)
ALBUMIN/GLOB SERPL: 1.2 G/DL
ALP SERPL-CCNC: 259 U/L (ref 39–117)
ALT SERPL W P-5'-P-CCNC: 81 U/L (ref 1–33)
ANION GAP SERPL CALCULATED.3IONS-SCNC: 11.4 MMOL/L (ref 5–15)
AST SERPL-CCNC: 54 U/L (ref 1–32)
BASOPHILS # BLD AUTO: 0.06 10*3/MM3 (ref 0–0.2)
BASOPHILS NFR BLD AUTO: 0.8 % (ref 0–1.5)
BILIRUB SERPL-MCNC: 0.4 MG/DL (ref 0–1.2)
BUN SERPL-MCNC: 20 MG/DL (ref 6–20)
BUN/CREAT SERPL: 28.2 (ref 7–25)
CALCIUM SPEC-SCNC: 9.9 MG/DL (ref 8.6–10.5)
CANDIDA SPECIES: NEGATIVE
CHLORIDE SERPL-SCNC: 102 MMOL/L (ref 98–107)
CHOLEST SERPL-MCNC: 293 MG/DL (ref 0–200)
CO2 SERPL-SCNC: 26.6 MMOL/L (ref 22–29)
CREAT SERPL-MCNC: 0.71 MG/DL (ref 0.57–1)
DEPRECATED RDW RBC AUTO: 42.7 FL (ref 37–54)
EGFRCR SERPLBLD CKD-EPI 2021: 113.9 ML/MIN/1.73
EOSINOPHIL # BLD AUTO: 0.08 10*3/MM3 (ref 0–0.4)
EOSINOPHIL NFR BLD AUTO: 1.1 % (ref 0.3–6.2)
ERYTHROCYTE [DISTWIDTH] IN BLOOD BY AUTOMATED COUNT: 13.1 % (ref 12.3–15.4)
FERRITIN SERPL-MCNC: 384 NG/ML (ref 13–150)
GARDNERELLA VAGINALIS: POSITIVE
GLOBULIN UR ELPH-MCNC: 4 GM/DL
GLUCOSE SERPL-MCNC: 94 MG/DL (ref 65–99)
HBA1C MFR BLD: 5.8 % (ref 4.8–5.6)
HCT VFR BLD AUTO: 36.4 % (ref 34–46.6)
HDLC SERPL-MCNC: 65 MG/DL (ref 40–60)
HGB BLD-MCNC: 12.3 G/DL (ref 12–15.9)
IMM GRANULOCYTES # BLD AUTO: 0.02 10*3/MM3 (ref 0–0.05)
IMM GRANULOCYTES NFR BLD AUTO: 0.3 % (ref 0–0.5)
IRON 24H UR-MRATE: 89 MCG/DL (ref 37–145)
IRON SATN MFR SERPL: 18 % (ref 20–50)
LDLC SERPL CALC-MCNC: 186 MG/DL (ref 0–100)
LDLC/HDLC SERPL: 2.82 {RATIO}
LYMPHOCYTES # BLD AUTO: 2.07 10*3/MM3 (ref 0.7–3.1)
LYMPHOCYTES NFR BLD AUTO: 27.7 % (ref 19.6–45.3)
MCH RBC QN AUTO: 30.1 PG (ref 26.6–33)
MCHC RBC AUTO-ENTMCNC: 33.8 G/DL (ref 31.5–35.7)
MCV RBC AUTO: 89.2 FL (ref 79–97)
MONOCYTES # BLD AUTO: 0.38 10*3/MM3 (ref 0.1–0.9)
MONOCYTES NFR BLD AUTO: 5.1 % (ref 5–12)
NEUTROPHILS NFR BLD AUTO: 4.87 10*3/MM3 (ref 1.7–7)
NEUTROPHILS NFR BLD AUTO: 65 % (ref 42.7–76)
NRBC BLD AUTO-RTO: 0 /100 WBC (ref 0–0.2)
PLATELET # BLD AUTO: 388 10*3/MM3 (ref 140–450)
PMV BLD AUTO: 9.4 FL (ref 6–12)
POTASSIUM SERPL-SCNC: 3.7 MMOL/L (ref 3.5–5.2)
PROT SERPL-MCNC: 8.6 G/DL (ref 6–8.5)
RBC # BLD AUTO: 4.08 10*6/MM3 (ref 3.77–5.28)
SODIUM SERPL-SCNC: 140 MMOL/L (ref 136–145)
T VAGINALIS DNA VAG QL PROBE+SIG AMP: NEGATIVE
T4 SERPL-MCNC: 9.11 MCG/DL (ref 4.5–11.7)
TIBC SERPL-MCNC: 502 MCG/DL (ref 298–536)
TRANSFERRIN SERPL-MCNC: 337 MG/DL (ref 200–360)
TRIGL SERPL-MCNC: 225 MG/DL (ref 0–150)
TSH SERPL DL<=0.05 MIU/L-ACNC: 2.22 UIU/ML (ref 0.27–4.2)
VIT B12 BLD-MCNC: 601 PG/ML (ref 211–946)
VLDLC SERPL-MCNC: 42 MG/DL (ref 5–40)
WBC NRBC COR # BLD AUTO: 7.48 10*3/MM3 (ref 3.4–10.8)

## 2025-02-07 PROCEDURE — 84466 ASSAY OF TRANSFERRIN: CPT | Performed by: NURSE PRACTITIONER

## 2025-02-07 PROCEDURE — 87624 HPV HI-RISK TYP POOLED RSLT: CPT | Performed by: NURSE PRACTITIONER

## 2025-02-07 PROCEDURE — G0123 SCREEN CERV/VAG THIN LAYER: HCPCS | Performed by: NURSE PRACTITIONER

## 2025-02-07 PROCEDURE — 80050 GENERAL HEALTH PANEL: CPT | Performed by: NURSE PRACTITIONER

## 2025-02-07 PROCEDURE — 99214 OFFICE O/P EST MOD 30 MIN: CPT | Performed by: NURSE PRACTITIONER

## 2025-02-07 PROCEDURE — 82607 VITAMIN B-12: CPT | Performed by: NURSE PRACTITIONER

## 2025-02-07 PROCEDURE — 83540 ASSAY OF IRON: CPT | Performed by: NURSE PRACTITIONER

## 2025-02-07 PROCEDURE — 87480 CANDIDA DNA DIR PROBE: CPT | Performed by: NURSE PRACTITIONER

## 2025-02-07 PROCEDURE — 87660 TRICHOMONAS VAGIN DIR PROBE: CPT | Performed by: NURSE PRACTITIONER

## 2025-02-07 PROCEDURE — 87510 GARDNER VAG DNA DIR PROBE: CPT | Performed by: NURSE PRACTITIONER

## 2025-02-07 PROCEDURE — 80061 LIPID PANEL: CPT | Performed by: NURSE PRACTITIONER

## 2025-02-07 PROCEDURE — 84436 ASSAY OF TOTAL THYROXINE: CPT | Performed by: NURSE PRACTITIONER

## 2025-02-07 PROCEDURE — 82728 ASSAY OF FERRITIN: CPT | Performed by: NURSE PRACTITIONER

## 2025-02-07 PROCEDURE — 83036 HEMOGLOBIN GLYCOSYLATED A1C: CPT | Performed by: NURSE PRACTITIONER

## 2025-02-07 PROCEDURE — 82306 VITAMIN D 25 HYDROXY: CPT | Performed by: NURSE PRACTITIONER

## 2025-02-07 PROCEDURE — 99395 PREV VISIT EST AGE 18-39: CPT | Performed by: NURSE PRACTITIONER

## 2025-02-10 DIAGNOSIS — E61.1 IRON DEFICIENCY: ICD-10-CM

## 2025-02-10 DIAGNOSIS — E55.9 VITAMIN D DEFICIENCY: Primary | ICD-10-CM

## 2025-02-10 RX ORDER — ASCORBIC ACID 500 MG
500 TABLET ORAL DAILY
Qty: 90 TABLET | Refills: 0 | Status: SHIPPED | OUTPATIENT
Start: 2025-02-10

## 2025-02-10 RX ORDER — ERGOCALCIFEROL 1.25 MG/1
50000 CAPSULE, LIQUID FILLED ORAL WEEKLY
Qty: 13 CAPSULE | Refills: 0 | Status: SHIPPED | OUTPATIENT
Start: 2025-02-10

## 2025-02-10 RX ORDER — FERROUS SULFATE 325(65) MG
325 TABLET, DELAYED RELEASE (ENTERIC COATED) ORAL
Qty: 90 TABLET | Refills: 0 | Status: SHIPPED | OUTPATIENT
Start: 2025-02-10

## 2025-02-11 DIAGNOSIS — R74.8 ELEVATED LIVER ENZYMES: Primary | ICD-10-CM

## 2025-02-12 DIAGNOSIS — R87.89 HIGH RISK HUMAN PAPILLOMAVIRUS (HPV) DNA TEST POSITIVE: Primary | ICD-10-CM

## 2025-02-12 DIAGNOSIS — R87.613 HIGH GRADE SQUAMOUS INTRAEPITHELIAL CERVICAL DYSPLASIA: ICD-10-CM

## 2025-02-12 LAB
CYTOLOGIST CVX/VAG CYTO: ABNORMAL
CYTOLOGY CVX/VAG DOC CYTO: ABNORMAL
CYTOLOGY CVX/VAG DOC THIN PREP: ABNORMAL
DX ICD CODE: ABNORMAL
DX ICD CODE: ABNORMAL
HPV I/H RISK 4 DNA CVX QL PROBE+SIG AMP: POSITIVE
OTHER STN SPEC: ABNORMAL
PATHOLOGIST CVX/VAG CYTO: ABNORMAL
RECOM F/U CVX/VAG CYTO: ABNORMAL
SERVICE CMNT-IMP: ABNORMAL
STAT OF ADQ CVX/VAG CYTO-IMP: ABNORMAL

## 2025-02-13 DIAGNOSIS — R87.613 HIGH GRADE SQUAMOUS INTRAEPITHELIAL CERVICAL DYSPLASIA: Primary | ICD-10-CM

## 2025-02-18 ENCOUNTER — HOSPITAL ENCOUNTER (OUTPATIENT)
Dept: ULTRASOUND IMAGING | Facility: HOSPITAL | Age: 35
Discharge: HOME | End: 2025-02-18
Admitting: NURSE PRACTITIONER
Payer: COMMERCIAL

## 2025-02-18 DIAGNOSIS — R22.0 FACIAL MASS: ICD-10-CM

## 2025-02-18 PROCEDURE — 76536 US EXAM OF HEAD AND NECK: CPT

## 2025-02-21 DIAGNOSIS — R93.89 ABNORMAL ULTRASOUND: Primary | ICD-10-CM

## 2025-02-25 NOTE — PROGRESS NOTES
"  New GYN eval and Colposcopy    Chief Complaint   Patient presents with    Abn pap       Procedure reviewed in detail.  She understands the potential risks include, but are not limited to, pain, bleeding, and infection.  Her questions have been answered.    Subjective/HPI:  34 y.o.   Social History     Substance and Sexual Activity   Sexual Activity Yes    Partners: Male       Tobacco/Nicotine use:  Used to in college  Consent signed: yes  S/p Gardisil vaccine: no    Pap result: IgP, Aptima HPV (2025 16:04) HSIL, HPV positive.  First abn pap.    Notes no menses, even though was told by onc should resume after stem cell transplant    Vital Signs:  /86   Pulse 94   Ht 157.5 cm (62\")   Wt 61.2 kg (135 lb)   LMP  (LMP Unknown) Comment: hasnt had a period since   BMI 24.69 kg/m²     Physical Exam  Genitourinary:           Comments: Red acw and mz        External genitalia- Without lesion   Perineum- Without lesion  Vagina- Without lesion   Cervix- Paracervical block : Benzocaine 20% spray applied to cervix after verbal consent.  Tolerated well. Adequate 100%TZ seen, AWL, MZ, Biopsies taken at lesion site/s 1100, 1200, 0100 and 0700, ECC performed, Monsels for hemostasis     Patient tolerated the procedure well.  Chaperone present during pelvic exam.    Assessment and Plan:  Diagnoses and all orders for this visit:    1. 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 Amenorrhea  Comments:  since , was told by ONC after stem cell transplant would get menses back  Orders:  -     Follicle Stimulating Hormone  -     Estradiol  -     TSH  -     T4, Free  -     Prolactin    3. Need for HPV vaccination  -     HPV Vaccine (HPV9)    4. Hodgkin lymphoma, hx of  Overview:  stage IV, dx'd 2009 radiation and chemo  remission to   [facility] cancer " chemo and stem cell transplant 2014, last seen 2017             Counseling:    We discussed her Pap diagnosis and HPV in detail.  HPV incidence, transmission, course, remission, progression, types, cervical cancer, genital warts, monitoring, and importance of compliance were reviewed.  Importance of maintaining a healthy lifestyle, HPV vaccine was discussed and recommended.  Written information was made available    Recommend continued routine follow up.  Discussed the importance of follow up as directed and consequences with lack of follow up (i.e. potential for cervical cancer).      PRECAUTIONS - It is common to have bright red spotting and dark discharge after today if biopsies performed.  After biopsies, avoid intercourse, tampons, tub bath or pool for 7 days.  Use OTC pain relievers prn.  Return to office or ER (if after hours/weekends) for severe pelvic pain, bleeding > 1 pad/2 hours, discharge that has a bad vaginal odor or vaginal itching, fever > 101.5, or any other concerns.        Follow Up:  Return in about 2 weeks (around 3/13/2025) for FU biospies and 1-2mo for second Gardisil, then 4mo later for 3rd Gardisil.      Angella Ryan DO  02/27/2025    [facility]  [facility] [place] [address]  Dept: [phone]  Dept Fax: [phone]  Loc: [phone]  Loc Fax: [phone]

## 2025-02-27 ENCOUNTER — OFFICE VISIT (OUTPATIENT)
Dept: OBSTETRICS AND GYNECOLOGY | Facility: CLINIC | Age: 35
End: 2025-02-27
Payer: COMMERCIAL

## 2025-02-27 VITALS
DIASTOLIC BLOOD PRESSURE: 86 MMHG | WEIGHT: 135 LBS | BODY MASS INDEX: 24.84 KG/M2 | HEART RATE: 94 BPM | SYSTOLIC BLOOD PRESSURE: 132 MMHG | HEIGHT: 62 IN

## 2025-02-27 DIAGNOSIS — R93.89 ABNORMAL ULTRASOUND: Primary | ICD-10-CM

## 2025-02-27 DIAGNOSIS — Z23 NEED FOR HPV VACCINATION: ICD-10-CM

## 2025-02-27 DIAGNOSIS — N91.2 AMENORRHEA: ICD-10-CM

## 2025-02-27 DIAGNOSIS — C81.90 HODGKIN LYMPHOMA, UNSPECIFIED HODGKIN LYMPHOMA TYPE, UNSPECIFIED BODY REGION: ICD-10-CM

## 2025-02-27 DIAGNOSIS — K11.9 LESION OF PAROTID GLAND: ICD-10-CM

## 2025-02-27 DIAGNOSIS — R87.613 HSIL ON PAP SMEAR OF CERVIX: Primary | ICD-10-CM

## 2025-02-27 LAB
B-HCG UR QL: NEGATIVE
EXPIRATION DATE: NORMAL
INTERNAL NEGATIVE CONTROL: NORMAL
INTERNAL POSITIVE CONTROL: NORMAL
Lab: NORMAL

## 2025-02-27 PROCEDURE — 88305 TISSUE EXAM BY PATHOLOGIST: CPT | Performed by: OBSTETRICS & GYNECOLOGY

## 2025-02-28 ENCOUNTER — TELEPHONE (OUTPATIENT)
Dept: OBSTETRICS AND GYNECOLOGY | Facility: CLINIC | Age: 35
End: 2025-02-28
Payer: COMMERCIAL

## 2025-02-28 NOTE — TELEPHONE ENCOUNTER
Spoke to patient - advised we rcvd her mychart messaeg back where, she would like the 1145 slot - and advised, however; the 1145 and noon sltos wwere taken.  Asked if she was ok with 12:15 and she said that would be fine.  Sent to end of day for pt to me moved down to 12:15.  Patient was advised would send back to  for the apptmt to be moved.

## 2025-03-03 ENCOUNTER — PREP FOR SURGERY (OUTPATIENT)
Dept: OTHER | Facility: HOSPITAL | Age: 35
End: 2025-03-03
Payer: COMMERCIAL

## 2025-03-03 DIAGNOSIS — D06.9 SEVERE DYSPLASIA OF CERVIX (CIN III): Primary | ICD-10-CM

## 2025-03-03 LAB
CYTO UR: NORMAL
LAB AP CASE REPORT: NORMAL
LAB AP CLINICAL INFORMATION: NORMAL
PATH REPORT.FINAL DX SPEC: NORMAL
PATH REPORT.GROSS SPEC: NORMAL

## 2025-03-03 RX ORDER — ONDANSETRON 2 MG/ML
4 INJECTION INTRAMUSCULAR; INTRAVENOUS EVERY 6 HOURS PRN
OUTPATIENT
Start: 2025-03-03

## 2025-03-03 RX ORDER — SODIUM CHLORIDE 0.9 % (FLUSH) 0.9 %
10 SYRINGE (ML) INJECTION AS NEEDED
OUTPATIENT
Start: 2025-03-03

## 2025-03-03 RX ORDER — SODIUM CHLORIDE 0.9 % (FLUSH) 0.9 %
3 SYRINGE (ML) INJECTION EVERY 12 HOURS SCHEDULED
OUTPATIENT
Start: 2025-03-03

## 2025-03-03 RX ORDER — SODIUM CHLORIDE, SODIUM LACTATE, POTASSIUM CHLORIDE, CALCIUM CHLORIDE 600; 310; 30; 20 MG/100ML; MG/100ML; MG/100ML; MG/100ML
150 INJECTION, SOLUTION INTRAVENOUS CONTINUOUS
OUTPATIENT
Start: 2025-03-03 | End: 2025-03-03

## 2025-03-03 RX ORDER — SODIUM CHLORIDE 9 MG/ML
40 INJECTION, SOLUTION INTRAVENOUS AS NEEDED
OUTPATIENT
Start: 2025-03-03 | End: 2025-03-03

## 2025-03-13 NOTE — PROGRESS NOTES
GYN HISTORY & PHYSICAL      Patient Name: Laura Hinton  : 1990  MRN: [id number]    Subjective     Chief Complaint:   Chief Complaint   Patient presents with    Follow-up     Follow up from Colpo     To discuss options after bx    HPI:  34 y.o.  No LMP recorded (lmp unknown). (Menstrual status: Other).  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 Her smoking use included cigarettes. She started smoking about 17 years ago. She has a 3 pack-year smoking history. She has never used smokeless tobacco. She reports current alcohol use of about 3.0 standard drinks of alcohol per week. She reports that she does not use drugs.    Family History:   Family History   Problem Relation Age of Onset    Cervical cancer Mother     Breast cancer Maternal Aunt     Breast cancer Niece     Uterine cancer Neg Hx     Ovarian cancer Neg Hx     Colon cancer Neg Hx     Pulmonary embolism Neg Hx     Deep vein thrombosis Neg Hx        Immunizations: Non contributory to this admission        Objective     Vitals:        PHYSICAL  EXAM:   Chaperone present during breast and/or pelvic exam if performed.  General- NAD, alert and oriented, appropriate  Psych- Normal mood, good memory  Cardiovascular- Regular rhythm, no murnurs  Respiratory- CTA to bases, no wheezes  Abdomen- not examined  Pelvic- see prior  Lymphatic- not examined  Rectal- Not examined.     Extremities- No edema, bilaterally equal      Lab/Imaging/Other: see below      Assessment / Plan     Assessment:  Diagnoses and all orders for this visit:    1. Severe dysplasia of cervix (MARILEE III) (Primary)  Overview:  Gardisil #1 of 3 Feb 2025 Feb 2025 HSIL HPV pos- first abn pap.   Colpo MARILEE III cvx bx and ECC +MARILEE III.  Plan LEEP w top hat.      2. Hodgkin lymphoma, unspecified Hodgkin lymphoma type, unspecified body region  Overview:  stage IV, dx'd 2009 radiation and chemo  remission to 2011  [facility] cancer chemo and stem cell transplant 2014, last seen 2017         3. Preoperative examination        Plan:   She declines expectant, conservative or medical management and desires surgery.  Will move forward with: LEEP and top hat, colpo and possible vag bxs.  Recommend abstinence for at least 2weeks prior to OR.  Counseling: Risks, benefits, alternatives, side-effects, and efficacy of surgery were discussed.  Surgery risks are not limited to anesthesia, bleeding, blood transfusion, infection, damage to surrounding organs, wound separation, re-operation, thromboembolic disease, death.    See separate signed counseling note for specific surgical risks.      Return for Postop FU 4weeks.          Signature: Electronically signed by Angella Ryan DO, 03/16/25, 9:46 AM EDT.      [facility] [place] [facility]  [address] [place] [address]  Dept: [phone]  Dept Fax: [phone]  Loc: [phone]  Loc Fax: [phone]

## 2025-03-14 ENCOUNTER — OFFICE VISIT (OUTPATIENT)
Dept: OBSTETRICS AND GYNECOLOGY | Facility: CLINIC | Age: 35
End: 2025-03-14
Payer: COMMERCIAL

## 2025-03-14 VITALS
HEIGHT: 62 IN | BODY MASS INDEX: 24.84 KG/M2 | DIASTOLIC BLOOD PRESSURE: 80 MMHG | WEIGHT: 135 LBS | SYSTOLIC BLOOD PRESSURE: 114 MMHG | HEART RATE: 87 BPM

## 2025-03-14 DIAGNOSIS — D06.9 SEVERE DYSPLASIA OF CERVIX (CIN III): Primary | ICD-10-CM

## 2025-03-14 DIAGNOSIS — C81.90 HODGKIN LYMPHOMA, UNSPECIFIED HODGKIN LYMPHOMA TYPE, UNSPECIFIED BODY REGION: ICD-10-CM

## 2025-03-14 DIAGNOSIS — Z01.818 PREOPERATIVE EXAMINATION: ICD-10-CM

## 2025-03-16 ENCOUNTER — PREP FOR SURGERY (OUTPATIENT)
Dept: OTHER | Facility: HOSPITAL | Age: 35
End: 2025-03-16
Payer: COMMERCIAL

## 2025-03-21 ENCOUNTER — HOSPITAL ENCOUNTER (OUTPATIENT)
Dept: ULTRASOUND IMAGING | Facility: HOSPITAL | Age: 35
Discharge: HOME | End: 2025-03-21
Payer: COMMERCIAL

## 2025-03-21 DIAGNOSIS — K11.8 PAROTID MASS: ICD-10-CM

## 2025-03-21 PROCEDURE — 25010000002 LIDOCAINE 1 % SOLUTION

## 2025-03-21 PROCEDURE — 87070 CULTURE OTHR SPECIMN AEROBIC: CPT | Performed by: NURSE PRACTITIONER

## 2025-03-21 PROCEDURE — 87205 SMEAR GRAM STAIN: CPT | Performed by: NURSE PRACTITIONER

## 2025-03-21 PROCEDURE — 76942 ECHO GUIDE FOR BIOPSY: CPT

## 2025-03-21 RX ORDER — LIDOCAINE HYDROCHLORIDE 10 MG/ML
INJECTION, SOLUTION INFILTRATION; PERINEURAL
Status: COMPLETED
Start: 2025-03-21 | End: 2025-03-21

## 2025-03-21 RX ADMIN — LIDOCAINE HYDROCHLORIDE: 10 INJECTION, SOLUTION INFILTRATION; PERINEURAL at 13:32

## 2025-03-24 LAB
BACTERIA SPEC AEROBE CULT: NORMAL
GRAM STN SPEC: NORMAL

## 2025-03-28 DIAGNOSIS — D11.9 WARTHIN TUMOR: Primary | ICD-10-CM

## 2025-03-31 ENCOUNTER — TELEPHONE (OUTPATIENT)
Dept: OBSTETRICS AND GYNECOLOGY | Age: 35
End: 2025-03-31
Payer: COMMERCIAL

## 2025-03-31 NOTE — TELEPHONE ENCOUNTER
Hub staff attempted to follow warm transfer process and was unsuccessful     Caller: Laura Hinton    Relationship to patient: Self    Best call back number: [phone]     Patient is needing: PATIENT IS SCHEDULED FOR HER 2ND GARDASIL INJECTION ON WEDNESDAY, 04/02/2025 AND WOULD LIKE TO CHANGE THAT TO THURSDAY OR FRIDAY THIS WEEK AT 3 PM IF POSSIBLE.

## 2025-04-01 ENCOUNTER — PATIENT MESSAGE (OUTPATIENT)
Dept: OBSTETRICS AND GYNECOLOGY | Age: 35
End: 2025-04-01
Payer: COMMERCIAL

## 2025-04-01 ENCOUNTER — TELEPHONE (OUTPATIENT)
Dept: OTOLARYNGOLOGY | Facility: CLINIC | Age: 35
End: 2025-04-01
Payer: COMMERCIAL

## 2025-04-01 NOTE — TELEPHONE ENCOUNTER
Left message with patient asking for a call back to reschedule appointment sooner. Told patient to ask for me.

## 2025-04-03 ENCOUNTER — OFFICE VISIT (OUTPATIENT)
Dept: OTOLARYNGOLOGY | Facility: CLINIC | Age: 35
End: 2025-04-03
Payer: COMMERCIAL

## 2025-04-03 ENCOUNTER — CLINICAL SUPPORT (OUTPATIENT)
Dept: OBSTETRICS AND GYNECOLOGY | Age: 35
End: 2025-04-03
Payer: COMMERCIAL

## 2025-04-03 VITALS
SYSTOLIC BLOOD PRESSURE: 151 MMHG | HEIGHT: 62 IN | HEART RATE: 102 BPM | WEIGHT: 137 LBS | BODY MASS INDEX: 25.21 KG/M2 | TEMPERATURE: 97.3 F | DIASTOLIC BLOOD PRESSURE: 92 MMHG

## 2025-04-03 DIAGNOSIS — D11.9 WARTHIN'S TUMOR: ICD-10-CM

## 2025-04-03 DIAGNOSIS — K11.8 MASS OF RIGHT PAROTID GLAND: Primary | ICD-10-CM

## 2025-04-03 DIAGNOSIS — Z23 NEED FOR HPV VACCINATION: Primary | ICD-10-CM

## 2025-04-03 NOTE — PROGRESS NOTES
Patient Name: Laura Hinton   Visit Date: 04/03/2025   Patient ID: [id number]  Provider: Boubacar Johnson MD    Sex: female  Location: [facility]   YOB: 1990  Location Address: [address] [place] [address], [address] [place] [address],?[place]?[id number]    Primary Care Provider Elmo Castro, APRN  Location Phone: [phone]    Referring Provider: Vidhya Thompson MD        Chief Complaint  Establish Care and warthin tumor    History of Present Illness  Laura Hinton is a 34 y.o. female who presents to [facility] for Establish Care and warthin tumor.  Patient was followed by Ms. Elmo Castro for routine annual physical examination on 2/7/2025 and noted a right preauricular mass noted to be parotid mass which she further worked up with ultrasound of head and neck Showing 1.7 cm heterogeneous partially cystic mass with internal vascularity recommending biopsy.  On 3/21/2025 patient underwent ultrasound-guided biopsy of the right parotid mass.  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  Patient was then sent to Dr. Thompson for evaluation and was subsequently referred to ENT for further evaluation and surgical management.    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 2 sprays each nostril daily, Disp: 16 g, Rfl: 1    loratadine (CLARITIN) 10 MG tablet, Take 1 tablet by  "mouth Daily., Disp: 90 tablet, Rfl: 1    vitamin C (ASCORBIC ACID) 500 MG tablet, Take 1 tablet by mouth Daily., Disp: 90 tablet, Rfl: 0    vitamin D (ERGOCALCIFEROL) 1.25 MG (53407 UT) capsule capsule, Take 1 capsule by mouth 1 (One) Time Per Week., Disp: 13 capsule, Rfl: 0     No Known Allergies    Social History     Tobacco Use    Smoking status: Former     Current packs/day: 0.00     Average packs/day: 0.3 packs/day for 12.0 years (3.0 ttl pk-yrs)     Types: Cigarettes     Start date: 2008     Quit date:      Years since quittin.2    Smokeless tobacco: Never   Vaping Use    Vaping status: Never Used   Substance Use Topics    Alcohol use: Yes     Alcohol/week: 3.0 standard drinks of alcohol     Types: 3 Cans of beer per week    Drug use: Never        Objective     Vital Signs:   Vitals:    25 1552   BP: 151/92   Pulse: 102   Temp: 97.3 °F (36.3 °C)   Weight: 62.1 kg (137 lb)   Height: 157.5 cm (62\")       Tobacco Use: Medium Risk (4/3/2025)    Patient History     Smoking Tobacco Use: Former     Smokeless Tobacco Use: Never     Passive Exposure: Not on file         Physical Exam    Constitutional   Appearance  well developed, well-nourished, alert and in no acute distress, voice clear and strong    Head   Inspection  no deformities or lesions      Face   Inspection  no facial lesions; House-Brackmann I/VI bilaterally   Palpation  no TMJ crepitus nor  muscle tenderness bilaterally   Right parotid mass 3 cm x 3 cm mobile located in the preauricular region in front of tragus    Eyes/Vision   Visual Fields  extraocular movements are intact, no spontaneous or gaze-induced nystagmus  Conjunctivae  clear   Sclerae  clear   Pupils and Irises  pupils equal, round, and reactive to light.   Nystagmus  not present     Ears, Nose, Mouth and Throat  Ears  External Ears  appearance within normal limits, no lesions present   Otoscopic Examination  tympanic membrane appearance within normal limits " bilaterally without perforations, well-aerated middle ears   Hearing  intact to conversational voice both ears   Tunning fork testing    Rinne:  Irving:    Nose  External Nose  appearance normal   Intranasal Exam  mucosa within normal limits, vestibules normal, no intranasal lesions present, septum midline, sinuses non tender to percussion   Modified Ta Test:    Oral Cavity  Oral Mucosa  oral mucosa normal without pallor or cyanosis   Lips  lip appearance normal   Teeth  normal dentition for age   Gums  gums pink, non-swollen, no bleeding present   Tongue  tongue appearance normal; normal mobility   Palate  hard palate normal, soft palate appearance normal with symmetric mobility     Throat  Oropharynx  no inflammation or lesions present, tonsils within normal limits   Hypopharynx  appearance within normal limits   Larynx  voice normal     Neck  Inspection/Palpation  normal appearance, no masses or tenderness, trachea midline; thyroid size normal, nontender, no nodules or masses present on palpation     Lymphatic  Neck  no lymphadenopathy present   Supraclavicular Nodes  no lymphadenopathy present   Preauricular Nodes  no lymphadenopathy present     Respiratory  Respiratory Effort  breathing unlabored   Inspection of Chest  normal appearance, no retractions     Musculoskeletal   Cervical back: Normal range of motion and neck supple.      Skin and Subcutaneous Tissue  General Inspection  Regarding face and neck - there are no rashes present, no lesions present, and no areas of discoloration     Neurologic  Cranial Nerves  cranial nerves II-XII are grossly intact bilaterally   Gait and Station  normal gait, able to stand without diffculty    Psychiatric  Judgement and Insight  judgment and insight intact   Mood and Affect  mood normal, affect appropriate       RESULTS REVIEWED    I have reviewed the following information:   [x]  Previous Internal Note  []  Previous External Note:   [x]  Ordered Tests & Results:       Pathology:   Lab Results   Component Value Date    Microscopic Description  03/21/2025     Microscopic examination performed.         TSH   Date Value Ref Range Status   02/07/2025 2.220 0.270 - 4.200 uIU/mL Final     Free T4   Date Value Ref Range Status   05/25/2023 1.06 0.93 - 1.70 ng/dL Final     PTH, Intact   Date Value Ref Range Status   06/29/2023 26.0 15.0 - 65.0 pg/mL Final     Calcium   Date Value Ref Range Status   02/07/2025 9.9 8.6 - 10.5 mg/dL Final   06/03/2020 8.9 8.4 - 10.2 mg/dL Final     25 Hydroxy, Vitamin D   Date Value Ref Range Status   02/07/2025 23.1 (L) 30.0 - 100.0 ng/ml Final       US Guided Salivary Gland Biopsy  Result Date: 3/21/2025  Impression: Technically successful fine-needle aspiration of a 1.8 cm right parotid lobe lesion. The lesion appears largely cystic or necrotic which may make pathologic diagnosis difficult. Electronically Signed: Arik Fam MD  3/21/2025 5:09 PM EDT  Workstation ID: [id number]    US Head Neck Soft Tissue  Result Date: 2/20/2025  Impression: In the region of the right parotid, there is a 1.7 cm heterogeneous part cystic mass with internal vascularity. This could be a primary parotid lesion. Recommend ultrasound-guided biopsy to further evaluate. Electronically Signed: Manjeet Cota MD  2/20/2025 4:08 PM EST  Workstation ID: [id number]      I have discussed the interpretation of the above results with the patient.    Procedures          Assessment and Plan   Diagnoses and all orders for this visit:    1. Mass of right parotid gland (Primary)  -     Case Request; Standing  -     Follow Anesthesia Guidelines / Protocol; Future  -     Follow Anesthesia Guidelines / Protocol; Standing  -     Case Request    2. Warthin's tumor  -     Case Request; Standing  -     Follow Anesthesia Guidelines / Protocol; Future  -     Follow Anesthesia Guidelines / Protocol; Standing  -     Case Request        (K11.8) Mass of right parotid gland - Plan: Case Request,  Follow Anesthesia Guidelines / Protocol, Follow Anesthesia Guidelines / Protocol, Case Request    (D11.9) Warthin's tumor - Plan: Case Request, Follow Anesthesia Guidelines / Protocol, Follow Anesthesia Guidelines / Protocol, Case Request     Laura Hinton  reports that she quit smoking about 5 years ago. Her smoking use included cigarettes. She started smoking about 17 years ago. She has a 3 pack-year smoking history. She has never used smokeless tobacco.         Plan:  Patient Instructions   1.  Patient has mass in the right parotid gland that has been growing for the last few months.  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  2.  Therefore I recommend patient to undergo right superficial parotidectomy with facial nerve preservation, monitor facial nerve, reconstruction of parotidectomy defect with pedicled fascial flap.    PAROTIDECTOMY: The risks and benefits and alternatives of parotidectomy were explained including but not limited to bleeding, infection, risks of the general anesthesia, pain, partial or total facial nerve injury, either temporary or permanent, Maribeth's syndrome or so-called gustatory sweating. Operative possibilities including total parotidectomy with or without neck dissection were discussed. Possibilities for delayed need for total parotidectomy and or neck dissection pending pathologic diagnosis were also discussed. The patient understood these risks. Questions were asked appropriately answered. No guarantees were made or implied.           Follow Up   Return Postop follow-up at 3 days for drain, 1 week for suture, 1 month for general follow-up.  Patient was given instructions and counseling regarding her condition or for health maintenance advice. Please see specific information pulled into the AVS if appropriate.

## 2025-04-14 NOTE — PRE-PROCEDURE INSTRUCTIONS
PATIENT INSTRUCTED TO BE:    - NOTHING TO EAT AFTER MIDNIGHT OR CHEW, EXCEPT CAN HAVE CLEAR LIQUIDS 2 HOURS PRIOR TO SURGERY ARRIVAL TIME , NO MORE THAN 8 OZ. (NOTHING RED)     - TO HOLD ALL VITAMINS, SUPPLEMENTS, NSAIDS FOR ONE WEEK PRIOR TO THEIR SURGICAL PROCEDURE        - BATHING INSTRUCTIONS GIVEN    INSTRUCTED NO LOTIONS, JEWELRY, PIERCINGS,  NAIL POLISH, OR DEODORANT DAY OF SURGERY        -INSTRUCTED TO TAKE THE FOLLOWING MEDICATIONS THE DAY OF SURGERY WITH SIPS OF WATER:   Claritin, Iron, Flonase        - DO NOT BRING ANY MEDICATIONS WITH YOU TO THE HOSPITAL THE DAY OF SURGERY, EXCEPT IF USE INHALERS. BRING INHALERS DAY OF SURGERY       - BRING CPAP OR BIPAP TO THE HOSPITAL ONLY IF YOU ARE SPENDING THE NIGHT    - DO NOT SMOKE OR VAPE 24 HOURS PRIOR TO PROCEDURE PER ANESTHESIA REQUEST     -MAKE SURE YOU HAVE A RIDE HOME OR SOMEONE TO STAY WITH YOU THE DAY OF THE PROCEDURE AFTER YOU GO HOME     - FOLLOW ANY OTHER INSTRUCTIONS GIVEN TO YOU BY YOUR SURGEON'S OFFICE.     COME TO [facility], FIRST FLOOR. CHECK IN AT THE DESK FOR REGISTRATION/SURGERY    - YOU WILL RECEIVE A PHONE CALL THE DAY PRIOR TO SURGERY BETWEEN 1PM AND 4 PM WITH ARRIVAL TIME, IF YOUR SURGERY IS ON A MONDAY YOU WILL RECEIVE A CALL THE FRIDAY PRIOR TO SURGERY DATE    - BRING CASH OR CREDIT CARD FOR COPAYMENT OF MEDICATIONS AFTER SURGERY IF YOU USE THE HOSPITAL PHARMACY (MEDS TO BED)    - PREADMISSION TESTING NURSE TOM BARDALES [phone] IF HAVE ANY QUESTIONS     -PATIENT PROVIDED THE NUMBER FOR PREOP SURGICAL DEPT IF HAD QUESTIONS AFTER HOURS PRIOR TO SURGERY ([phone]).  INFORMED PT IF NO ANSWER, LEAVE A MESSAGE AND SOMEONE WILL RETURN THEIR CALL       PATIENT VERBALIZED UNDERSTANDING

## 2025-04-15 NOTE — H&P
GYN HISTORY & PHYSICAL        Patient Name: Laura Hinton  : 1990  MRN: [id number]     Subjective      Chief Complaint:        Chief Complaint   Patient presents with    Follow-up       Follow up from [facility]      To discuss options after bx     HPI:  34 y.o.  No LMP recorded (lmp unknown). (Menstrual status: Other).  Social History           Substance and Sexual Activity   Sexual Activity Yes    Partners: Male      Tissue Pathology Exam (2025 12:33)     Progress Notes by Angella Ryan DO (2025 11:30)     MARILEE III     ROS: All negative except listed in HPI     Personal History      PMHx:    Medical History        Past Medical History:   Diagnosis Date    Abnormal Pap smear of cervix 02/10/25    Anemia      Anxiety      HPV (human papilloma virus) infection 02/10/25    Hyperlipidemia 02/10/2025    Lymphoma in remission              OBHx:            OB History    Para Term  AB Living    0 0 0 0 0 0    SAB IAB Ectopic Molar Multiple Live Births    0 0 0 0 0 0          Medications:  ferrous sulfate, fluticasone, loratadine, vitamin C, and vitamin D     Allergies:  Allergies   No Known Allergies        PSHx:   Surgical History         Past Surgical History:   Procedure Laterality Date    TOTAL HIP ARTHROPLASTY Bilateral      TOTAL SHOULDER REPLACEMENT Right              Social History:    reports that she quit smoking about 5 years ago. Her smoking use included cigarettes. She started smoking about 17 years ago. She has a 3 pack-year smoking history. She has never used smokeless tobacco. She reports current alcohol use of about 3.0 standard drinks of alcohol per week. She reports that she does not use drugs.     Family History:         Family History   Problem Relation Age of Onset    Cervical cancer Mother      Breast cancer Maternal Aunt      Breast cancer Niece      Uterine cancer Neg Hx      Ovarian cancer Neg Hx      Colon cancer Neg Hx      Pulmonary embolism Neg Hx      Deep  vein thrombosis Neg Hx           Immunizations: Non contributory to this admission           Objective      Vitals:         PHYSICAL EXAM:       Chaperone present during breast and/or pelvic exam if performed.  General- NAD, alert and oriented, appropriate  Psych- Normal mood, good memory  Cardiovascular- Regular rhythm, no murnurs  Respiratory- CTA to bases, no wheezes  Abdomen- not examined  Pelvic- see prior  Lymphatic- not examined  Rectal- Not examined.     Extremities- No edema, bilaterally equal        Lab/Imaging/Other: see below        Assessment / Plan      Assessment:  Diagnoses and all orders for this visit:     1. Severe dysplasia of cervix (MARILEE III) (Primary)  Overview:  Gardisil #1 of 3 Feb 2025 Feb 2025 HSIL HPV pos- first abn pap.   Colpo MARILEE III cvx bx and ECC +MARILEE III.  Plan LEEP w top hat.        2. Hodgkin lymphoma, unspecified Hodgkin lymphoma type, unspecified body region  Overview:  stage IV, dx'd 2009 radiation and chemo  remission to 2011  [facility] cancer chemo and stem cell transplant 2014, last seen 2017            Plan:   She declines expectant, conservative or medical management and desires surgery.  Will move forward with: LEEP and top hat, colpo and possible vag bxs.  Recommend abstinence for at least 2weeks prior to OR.  Counseling: Risks, benefits, alternatives, side-effects, and efficacy of surgery were discussed.  Surgery risks are not limited to anesthesia, bleeding, blood transfusion, infection, damage to surrounding organs, wound separation, re-operation, thromboembolic disease, death.    See separate signed counseling note for specific surgical risks.              Signature: Electronically signed by Angella Ryan DO, 04/15/25, 4:34 PM EDT.          [facility]  [address] [place] [address]  Dept: [phone]  Dept Fax: [phone]  Loc: [phone]  Loc Fax: [phone]

## 2025-04-16 ENCOUNTER — HOSPITAL ENCOUNTER (OUTPATIENT)
Facility: HOSPITAL | Age: 35
Discharge: HOME | End: 2025-04-16
Attending: OBSTETRICS & GYNECOLOGY | Admitting: OBSTETRICS & GYNECOLOGY
Payer: COMMERCIAL

## 2025-04-16 ENCOUNTER — ANESTHESIA (OUTPATIENT)
Dept: PERIOP | Facility: HOSPITAL | Age: 35
End: 2025-04-16
Payer: COMMERCIAL

## 2025-04-16 ENCOUNTER — ANESTHESIA EVENT (OUTPATIENT)
Dept: PERIOP | Facility: HOSPITAL | Age: 35
End: 2025-04-16
Payer: COMMERCIAL

## 2025-04-16 VITALS
BODY MASS INDEX: 24.99 KG/M2 | HEART RATE: 84 BPM | HEIGHT: 62 IN | WEIGHT: 135.8 LBS | OXYGEN SATURATION: 100 % | SYSTOLIC BLOOD PRESSURE: 106 MMHG | RESPIRATION RATE: 21 BRPM | TEMPERATURE: 98 F | DIASTOLIC BLOOD PRESSURE: 78 MMHG

## 2025-04-16 DIAGNOSIS — D06.9 SEVERE DYSPLASIA OF CERVIX (CIN III): ICD-10-CM

## 2025-04-16 DIAGNOSIS — D06.9 CIN III (CERVICAL INTRAEPITHELIAL NEOPLASIA GRADE III) WITH SEVERE DYSPLASIA: Primary | ICD-10-CM

## 2025-04-16 LAB
ABO GROUP BLD: NORMAL
ABO GROUP BLD: NORMAL
BASOPHILS # BLD AUTO: 0.05 10*3/MM3 (ref 0–0.2)
BASOPHILS NFR BLD AUTO: 0.7 % (ref 0–1.5)
BLD GP AB SCN SERPL QL: NEGATIVE
DEPRECATED RDW RBC AUTO: 44.8 FL (ref 37–54)
EOSINOPHIL # BLD AUTO: 0.09 10*3/MM3 (ref 0–0.4)
EOSINOPHIL NFR BLD AUTO: 1.3 % (ref 0.3–6.2)
ERYTHROCYTE [DISTWIDTH] IN BLOOD BY AUTOMATED COUNT: 13.6 % (ref 12.3–15.4)
HCG INTACT+B SERPL-ACNC: <0.5 MIU/ML
HCT VFR BLD AUTO: 35.3 % (ref 34–46.6)
HGB BLD-MCNC: 11.8 G/DL (ref 12–15.9)
IMM GRANULOCYTES # BLD AUTO: 0.02 10*3/MM3 (ref 0–0.05)
IMM GRANULOCYTES NFR BLD AUTO: 0.3 % (ref 0–0.5)
LYMPHOCYTES # BLD AUTO: 1.69 10*3/MM3 (ref 0.7–3.1)
LYMPHOCYTES NFR BLD AUTO: 24.6 % (ref 19.6–45.3)
MCH RBC QN AUTO: 30.3 PG (ref 26.6–33)
MCHC RBC AUTO-ENTMCNC: 33.4 G/DL (ref 31.5–35.7)
MCV RBC AUTO: 90.5 FL (ref 79–97)
MONOCYTES # BLD AUTO: 0.51 10*3/MM3 (ref 0.1–0.9)
MONOCYTES NFR BLD AUTO: 7.4 % (ref 5–12)
NEUTROPHILS NFR BLD AUTO: 4.5 10*3/MM3 (ref 1.7–7)
NEUTROPHILS NFR BLD AUTO: 65.7 % (ref 42.7–76)
NRBC BLD AUTO-RTO: 0 /100 WBC (ref 0–0.2)
PLATELET # BLD AUTO: 315 10*3/MM3 (ref 140–450)
PMV BLD AUTO: 9.3 FL (ref 6–12)
RBC # BLD AUTO: 3.9 10*6/MM3 (ref 3.77–5.28)
RH BLD: POSITIVE
RH BLD: POSITIVE
T&S EXPIRATION DATE: NORMAL
WBC NRBC COR # BLD AUTO: 6.86 10*3/MM3 (ref 3.4–10.8)

## 2025-04-16 PROCEDURE — 86850 RBC ANTIBODY SCREEN: CPT | Performed by: OBSTETRICS & GYNECOLOGY

## 2025-04-16 PROCEDURE — 25810000003 LACTATED RINGERS PER 1000 ML: Performed by: ANESTHESIOLOGY

## 2025-04-16 PROCEDURE — 85025 COMPLETE CBC W/AUTO DIFF WBC: CPT | Performed by: OBSTETRICS & GYNECOLOGY

## 2025-04-16 PROCEDURE — 25010000002 PROPOFOL 10 MG/ML EMULSION: Performed by: NURSE ANESTHETIST, CERTIFIED REGISTERED

## 2025-04-16 PROCEDURE — 86901 BLOOD TYPING SEROLOGIC RH(D): CPT | Performed by: OBSTETRICS & GYNECOLOGY

## 2025-04-16 PROCEDURE — 25010000002 MIDAZOLAM PER 1MG: Performed by: ANESTHESIOLOGY

## 2025-04-16 PROCEDURE — 25010000002 LIDOCAINE PF 2% 2 % SOLUTION: Performed by: NURSE ANESTHETIST, CERTIFIED REGISTERED

## 2025-04-16 PROCEDURE — 25010000002 LIDOCAINE 1% - EPINEPHRINE 1:100000 1 %-1:100000 SOLUTION: Performed by: OBSTETRICS & GYNECOLOGY

## 2025-04-16 PROCEDURE — 86900 BLOOD TYPING SEROLOGIC ABO: CPT | Performed by: OBSTETRICS & GYNECOLOGY

## 2025-04-16 PROCEDURE — 84702 CHORIONIC GONADOTROPIN TEST: CPT | Performed by: OBSTETRICS & GYNECOLOGY

## 2025-04-16 RX ORDER — TRAMADOL HYDROCHLORIDE 50 MG/1
50 TABLET ORAL ONCE AS NEEDED
Status: DISCONTINUED | OUTPATIENT
Start: 2025-04-16 | End: 2025-04-16 | Stop reason: HOSPADM

## 2025-04-16 RX ORDER — ACETAMINOPHEN 325 MG/1
650 TABLET ORAL ONCE
Status: DISCONTINUED | OUTPATIENT
Start: 2025-04-16 | End: 2025-04-16 | Stop reason: HOSPADM

## 2025-04-16 RX ORDER — SODIUM CHLORIDE, SODIUM LACTATE, POTASSIUM CHLORIDE, CALCIUM CHLORIDE 600; 310; 30; 20 MG/100ML; MG/100ML; MG/100ML; MG/100ML
9 INJECTION, SOLUTION INTRAVENOUS CONTINUOUS PRN
Status: DISCONTINUED | OUTPATIENT
Start: 2025-04-16 | End: 2025-04-16 | Stop reason: HOSPADM

## 2025-04-16 RX ORDER — OXYCODONE AND ACETAMINOPHEN 5; 325 MG/1; MG/1
1 TABLET ORAL ONCE AS NEEDED
Refills: 0 | Status: DISCONTINUED | OUTPATIENT
Start: 2025-04-16 | End: 2025-04-16 | Stop reason: HOSPADM

## 2025-04-16 RX ORDER — IBUPROFEN 600 MG/1
600 TABLET, FILM COATED ORAL EVERY 6 HOURS PRN
Status: DISCONTINUED | OUTPATIENT
Start: 2025-04-16 | End: 2025-04-16 | Stop reason: HOSPADM

## 2025-04-16 RX ORDER — MIDAZOLAM HYDROCHLORIDE 2 MG/2ML
2 INJECTION, SOLUTION INTRAMUSCULAR; INTRAVENOUS ONCE
Status: COMPLETED | OUTPATIENT
Start: 2025-04-16 | End: 2025-04-16

## 2025-04-16 RX ORDER — PROMETHAZINE HYDROCHLORIDE 12.5 MG/1
25 TABLET ORAL ONCE AS NEEDED
Status: DISCONTINUED | OUTPATIENT
Start: 2025-04-16 | End: 2025-04-16 | Stop reason: HOSPADM

## 2025-04-16 RX ORDER — PROMETHAZINE HYDROCHLORIDE 25 MG/1
25 SUPPOSITORY RECTAL ONCE AS NEEDED
Status: DISCONTINUED | OUTPATIENT
Start: 2025-04-16 | End: 2025-04-16 | Stop reason: HOSPADM

## 2025-04-16 RX ORDER — PROPOFOL 10 MG/ML
VIAL (ML) INTRAVENOUS AS NEEDED
Status: DISCONTINUED | OUTPATIENT
Start: 2025-04-16 | End: 2025-04-16 | Stop reason: SURG

## 2025-04-16 RX ORDER — SODIUM CHLORIDE 0.9 % (FLUSH) 0.9 %
3 SYRINGE (ML) INJECTION EVERY 12 HOURS SCHEDULED
Status: DISCONTINUED | OUTPATIENT
Start: 2025-04-16 | End: 2025-04-16 | Stop reason: HOSPADM

## 2025-04-16 RX ORDER — ACETAMINOPHEN 325 MG/1
650 TABLET ORAL EVERY 4 HOURS PRN
Qty: 30 TABLET | Refills: 0 | Status: SHIPPED | OUTPATIENT
Start: 2025-04-16

## 2025-04-16 RX ORDER — KETAMINE HYDROCHLORIDE 10 MG/ML
INJECTION, SOLUTION INTRAMUSCULAR; INTRAVENOUS AS NEEDED
Status: DISCONTINUED | OUTPATIENT
Start: 2025-04-16 | End: 2025-04-16 | Stop reason: SURG

## 2025-04-16 RX ORDER — LIDOCAINE HYDROCHLORIDE 20 MG/ML
INJECTION, SOLUTION EPIDURAL; INFILTRATION; INTRACAUDAL; PERINEURAL AS NEEDED
Status: DISCONTINUED | OUTPATIENT
Start: 2025-04-16 | End: 2025-04-16 | Stop reason: SURG

## 2025-04-16 RX ORDER — IBUPROFEN 600 MG/1
600 TABLET, FILM COATED ORAL EVERY 6 HOURS PRN
Qty: 30 TABLET | Refills: 0 | Status: SHIPPED | OUTPATIENT
Start: 2025-04-16

## 2025-04-16 RX ORDER — SODIUM CHLORIDE 0.9 % (FLUSH) 0.9 %
10 SYRINGE (ML) INJECTION AS NEEDED
Status: DISCONTINUED | OUTPATIENT
Start: 2025-04-16 | End: 2025-04-16 | Stop reason: HOSPADM

## 2025-04-16 RX ORDER — ACETAMINOPHEN 500 MG
1000 TABLET ORAL ONCE
Status: COMPLETED | OUTPATIENT
Start: 2025-04-16 | End: 2025-04-16

## 2025-04-16 RX ORDER — ONDANSETRON 2 MG/ML
4 INJECTION INTRAMUSCULAR; INTRAVENOUS ONCE AS NEEDED
Status: DISCONTINUED | OUTPATIENT
Start: 2025-04-16 | End: 2025-04-16 | Stop reason: HOSPADM

## 2025-04-16 RX ORDER — OXYCODONE HYDROCHLORIDE 5 MG/1
5 TABLET ORAL
Status: DISCONTINUED | OUTPATIENT
Start: 2025-04-16 | End: 2025-04-16 | Stop reason: HOSPADM

## 2025-04-16 RX ORDER — SODIUM CHLORIDE 9 MG/ML
40 INJECTION, SOLUTION INTRAVENOUS AS NEEDED
Status: DISCONTINUED | OUTPATIENT
Start: 2025-04-16 | End: 2025-04-16 | Stop reason: HOSPADM

## 2025-04-16 RX ORDER — LIDOCAINE HYDROCHLORIDE AND EPINEPHRINE 10; 10 MG/ML; UG/ML
INJECTION, SOLUTION INFILTRATION; PERINEURAL AS NEEDED
Status: DISCONTINUED | OUTPATIENT
Start: 2025-04-16 | End: 2025-04-16 | Stop reason: HOSPADM

## 2025-04-16 RX ORDER — ONDANSETRON 2 MG/ML
4 INJECTION INTRAMUSCULAR; INTRAVENOUS EVERY 6 HOURS PRN
Status: DISCONTINUED | OUTPATIENT
Start: 2025-04-16 | End: 2025-04-16 | Stop reason: HOSPADM

## 2025-04-16 RX ORDER — DEXMEDETOMIDINE HYDROCHLORIDE 100 UG/ML
INJECTION, SOLUTION INTRAVENOUS AS NEEDED
Status: DISCONTINUED | OUTPATIENT
Start: 2025-04-16 | End: 2025-04-16 | Stop reason: SURG

## 2025-04-16 RX ORDER — TRAMADOL HYDROCHLORIDE 50 MG/1
50 TABLET ORAL EVERY 6 HOURS PRN
Qty: 5 TABLET | Refills: 0 | Status: SHIPPED | OUTPATIENT
Start: 2025-04-16

## 2025-04-16 RX ORDER — FENTANYL CITRATE 50 UG/ML
50 INJECTION, SOLUTION INTRAMUSCULAR; INTRAVENOUS
Status: DISCONTINUED | OUTPATIENT
Start: 2025-04-16 | End: 2025-04-16 | Stop reason: HOSPADM

## 2025-04-16 RX ORDER — SODIUM CHLORIDE, SODIUM LACTATE, POTASSIUM CHLORIDE, CALCIUM CHLORIDE 600; 310; 30; 20 MG/100ML; MG/100ML; MG/100ML; MG/100ML
150 INJECTION, SOLUTION INTRAVENOUS CONTINUOUS
Status: DISCONTINUED | OUTPATIENT
Start: 2025-04-16 | End: 2025-04-16 | Stop reason: HOSPADM

## 2025-04-16 RX ORDER — PROMETHAZINE HYDROCHLORIDE 12.5 MG/1
12.5 TABLET ORAL ONCE AS NEEDED
Status: DISCONTINUED | OUTPATIENT
Start: 2025-04-16 | End: 2025-04-16 | Stop reason: HOSPADM

## 2025-04-16 RX ADMIN — PROPOFOL 50 MG: 10 INJECTION, EMULSION INTRAVENOUS at 15:31

## 2025-04-16 RX ADMIN — KETAMINE HYDROCHLORIDE 10 MG: 10 INJECTION INTRAMUSCULAR; INTRAVENOUS at 15:03

## 2025-04-16 RX ADMIN — DEXMEDETOMIDINE 10 MCG: 100 INJECTION, SOLUTION, CONCENTRATE INTRAVENOUS at 15:03

## 2025-04-16 RX ADMIN — KETAMINE HYDROCHLORIDE 10 MG: 10 INJECTION INTRAMUSCULAR; INTRAVENOUS at 15:23

## 2025-04-16 RX ADMIN — OXYCODONE 5 MG: 5 TABLET ORAL at 16:04

## 2025-04-16 RX ADMIN — PROPOFOL 200 MCG/KG/MIN: 10 INJECTION, EMULSION INTRAVENOUS at 14:59

## 2025-04-16 RX ADMIN — DEXMEDETOMIDINE 10 MCG: 100 INJECTION, SOLUTION, CONCENTRATE INTRAVENOUS at 14:58

## 2025-04-16 RX ADMIN — LIDOCAINE HYDROCHLORIDE 50 MG: 20 INJECTION, SOLUTION INTRAVENOUS at 14:59

## 2025-04-16 RX ADMIN — MIDAZOLAM HYDROCHLORIDE 2 MG: 2 INJECTION, SOLUTION INTRAMUSCULAR; INTRAVENOUS at 14:39

## 2025-04-16 RX ADMIN — SODIUM CHLORIDE, POTASSIUM CHLORIDE, SODIUM LACTATE AND CALCIUM CHLORIDE 9 ML/HR: 600; 310; 30; 20 INJECTION, SOLUTION INTRAVENOUS at 12:42

## 2025-04-16 RX ADMIN — ACETAMINOPHEN 1000 MG: 500 TABLET ORAL at 12:42

## 2025-04-16 RX ADMIN — PROPOFOL 70 MG: 10 INJECTION, EMULSION INTRAVENOUS at 14:59

## 2025-04-16 NOTE — INTERVAL H&P NOTE
H&P reviewed.  The patient was examined and there are no changes to the H&P What Type Of Note Output Would You Prefer (Optional)?: Bullet Format What Is The Reason For Today's Visit?: Full Body Skin Examination What Is The Reason For Today's Visit? (Being Monitored For X): concerning skin lesions on an annual basis

## 2025-04-16 NOTE — DISCHARGE INSTRUCTIONS
DISCHARGE INSTRUCTIONS  GYNECOLOGICAL  PROCEDURES      For your surgery you had:  General anesthesia (you may have a sore throat for the first 24 hours)  IV sedation  Local anesthesia  Monitored anesthesia care    You received an anesthesia medication today that can cause hormonal forms of birth control to be ineffective. You should use a different form of birth control (to prevent pregnancy) for 7 days.   You may experience dizziness, drowsiness, or lightheadedness for several hours following surgery.  Do not stay alone today or tonight.  Limit your activity for 24 hours.  Resume your diet slowly.  Follow any special dietary instructions you may have been given by your doctor.  You should not drive or operate machinery, drink alcohol, or sign legally binding documents for 24 hours or while you are taking pain medication.   Last dose of pain medication was given at: tylenol at 1242pm may start next dose of tylenol after 442pm today , alternate with ibuprofen  .  NOTIFY YOUR DOCTOR IF YOU EXPERIENCE ANY OF THE FOLLOWING:  Temperature greater than 101 degrees Fahrenheit  Shaking Chills  Redness or excessive drainage from incision  Nausea, vomiting and/or pain that is not controlled by prescribed medications  Increase in bleeding or bleeding that is excessive  Unable to urinate in 6 hours after surgery  If unable to reach your doctor, please go to the closest Emergency Room x change pads as needed for bleeding     x you may resume intercourse and the use of tampons as your physician has instructed you.  x nothing in the vagina for 4 weeks to include intercourse, douches, or tampons.  x vaginal bleeding may be expected for several days with flow decreasing with time and never any heavier than a normal   period.  If you have an ablation, vaginal discharge is expected after bleeding stops.   If you have foul smelling discharge, notify your physician.  Medications per physician instructions as indicated on Discharge  Medication Information Sheet.  You should see   for follow-up care   on  As scheduled / as needed  .  Phone number: [phone]     SPECIAL INSTRUCTIONS:                           DR. HURLEY'S POST-OPERATIVE GYN SURGERY PRECAUTIONS AND Answers to FAQS     It is very important after a LEEP to have NO SEX, tampons, TUB BATH or POOL for FOUR weeks.     VAGINAL BLEEDING/SPOTTING may continue on and off over the next several weeks after surgery.  You should not be needing to change a pad frequently and it should not be heavy.  If you are not sure, it is persisting, or it is increasing, please call our office.     FEVER or CHILLS or NOT FEELING WELL: call our office.  If the office is closed, you need to be seen in acute care or ER.       SWELLING/EDEMA:  should slowly improve after surgery.  Your legs/ankles should be fairly similar in size.  A red, painful, hot, swollen leg (usually just one side) can be a sign of a blood clot and should be evaluated immediately.  Call our office.  If it is after hours or a weekend, you must be seen IMMEDIATELY IN THE ER.      WORK and SCHOOL TIME OFF: depends on your specific surgery type, surrounding circumstances, and your work insurance/school rules.  If you have questions, please call Carine or Anai at [phone] (ext. [phone]).  Or email Carine at [email or web address].  They will assist in required paperwork for you and/or family members.      Any other QUESTIONS or CONCERNS, please call [facility] [place] [place] KAYY Overton at [phone].

## 2025-04-16 NOTE — OP NOTE
GYN Operative Note      Date of Service:  04/16/25     Pre-Operative Dx:   Severe dysplasia of cervix (MARILEE III) [D06.9] cvx bx and ECC    Postoperative dx:   Same as above    Procedure(s):  Colposcopy of cervix and vagina  LEEP of entire transformation zone  Top-Hat of endocervical canal  Endocervical curettage    Surgeon:  Angella Ryan DO    Assistant:  none    Anesthesia:  Monitored Anesthesia Care (MAC) then LMA  Anesthesiologist: Michael Briseno MD  CRNA: Padmini Vasquez CRNA    Estimated Blood Loss: 5 Mls    Findings:   Large acetowhite lesion encompassing transformation zone, similar in appearance as to office colposcopy.  Entire vagina evaluated with colposcopy, no lesions noted.    Specimens:  ID Type Source Tests Collected by Time   A : LEEP-- TRANSFORMATIN ZONE TAGGED AT 12 Tissue Cervix TISSUE PATHOLOGY EXAM Angella Ryan DO 4/16/2025 1425   B : Endocervical curettings Tissue Endocervix TISSUE PATHOLOGY EXAM Angella Ryan DO 4/16/2025 1425   C : TOP HAT AT ENDOCERVICAL CANAL TAGGED AT 12 Tissue Cervix TISSUE PATHOLOGY EXAM Angella Ryan DO 4/16/2025 1542       Procedure Details:  The patient was brought to the operating room where anesthesia was placed and deemed to be adequate.  She was prepped and draped in a normal sterile fashion and placed in high lithotomy position.  I was then called into the room.  Speculum was placed in the vagina.  Vinegar solution and Lugol's was used to coat the cervix and vagina.       Colposcopy was performed with findings as above.  A cervical block was placed with 10ml of local anesthetic with epinephrine.  A size 20 x 12 mm loop was used.   The entire transformation zone and all visual colposcopic lesions removed.  A top hat was performed.  10 x 10 size loop was used.    The specimen was handed off for permanent pathology and pinned in paraffin.  An endocervical curettage was then performed.  The LEEP bed was made hemostatic with Bovie cautery and Monsel's/astringent.       The patient tolerated the procedure well.  Instrument, lap, and needle counts were correct.  She was taken to recovery room in stable condition.       Complications:  None     Condition:  Good     Disposition:  PACU       Electronically signed by:  Angella Ryan DO, 04/16/25, 4:01 PM EDT.

## 2025-04-16 NOTE — ANESTHESIA PREPROCEDURE EVALUATION
Anesthesia Evaluation     Patient summary reviewed and Nursing notes reviewed   no history of anesthetic complications:   NPO Solid Status: > 8 hours  NPO Liquid Status: > 2 hours           Airway   Mallampati: II  TM distance: >3 FB  Neck ROM: full  No difficulty expected  Dental      Pulmonary - normal exam    breath sounds clear to auscultation  (+) a smoker Former,  Cardiovascular - normal exam  Exercise tolerance: good (4-7 METS)    Rhythm: regular  Rate: normal    (+) hyperlipidemia      Neuro/Psych  (+) psychiatric history Anxiety  GI/Hepatic/Renal/Endo - negative ROS     Musculoskeletal     Abdominal    Substance History      OB/GYN          Other   arthritis,   history of cancer (lymphoma 2015) remission    ROS/Med Hx Other: Parotid  surgery May 2025                    Anesthesia Plan    ASA 2     general     (Patient understands anesthesia not responsible for dental damage.)  intravenous induction     Anesthetic plan, risks, benefits, and alternatives have been provided, discussed and informed consent has been obtained with: patient.    Plan discussed with CRNA.        CODE STATUS:

## 2025-04-16 NOTE — ANESTHESIA POSTPROCEDURE EVALUATION
Patient: Laura Hinton    Procedure Summary       Date: 04/16/25 Room / Location: [facility] OR  / [facility]    Anesthesia Start: 1453 Anesthesia Stop: 1602    Procedure: LOOP ELECTROCAUTERY EXCISION PROCEDURE.  Removing abnormal cells on the cervix (Cervix) Diagnosis:       Severe dysplasia of cervix (MARILEE III)      (Severe dysplasia of cervix (MARILEE III) [D06.9])    Surgeons: Angella Ryan DO Provider: Michael Briseno MD    Anesthesia Type: general ASA Status: 2            Anesthesia Type: general    Vitals  Vitals Value Taken Time   BP 94/62 04/16/25 16:05   Temp 36.6 °C (97.9 °F) 04/16/25 15:57   Pulse 87 04/16/25 16:05   Resp 14 04/16/25 15:57   SpO2 99 % 04/16/25 16:05   Vitals shown include unfiled device data.        Post Anesthesia Care and Evaluation    Patient location during evaluation: bedside  Patient participation: complete - patient participated  Level of consciousness: awake  Pain management: adequate    Airway patency: patent  PONV Status: none  Cardiovascular status: acceptable and stable  Respiratory status: acceptable  Hydration status: acceptable

## 2025-04-21 ENCOUNTER — RESULTS FOLLOW-UP (OUTPATIENT)
Dept: PERIOP | Facility: HOSPITAL | Age: 35
End: 2025-04-21
Payer: COMMERCIAL

## 2025-04-24 NOTE — TELEPHONE ENCOUNTER
Left a message for the patient to make sure she saw the [employer] message regarding her results and see if she had any questions.

## 2025-04-29 ENCOUNTER — TELEPHONE (OUTPATIENT)
Dept: OBSTETRICS AND GYNECOLOGY | Age: 35
End: 2025-04-29

## 2025-04-29 NOTE — TELEPHONE ENCOUNTER
"Provider: Angella Ryan D     Caller: Laura Hinton \"Laura\"  Female, 34 y.o., 1990  MRN: [id number]  CSN: [id number]  Phone: [phone]    Relationship to Patient: SELF        Reason for Call: PT REQ A CALL BACK TO BE ADVISED, PT REPORTS VAGINAL BLEEDING, NOT HEAVY AS WELL AS SMALL CLOTTING     2 WEEKS POST OP      "

## 2025-04-29 NOTE — TELEPHONE ENCOUNTER
Spoke with Laura and told her that having some bleeding, spotting or clots can be expected after having biopsies done. Advised her that if the bleeding gets heavy, starts running a fever or cramping that doubles her over, she would need to be seen in the ER. Patient voiced understanding.

## 2025-05-07 NOTE — TELEPHONE ENCOUNTER
Left a message for the patient to make sure she saw the [employer] message regarding her results and see if she had any questions.

## 2025-05-09 NOTE — TELEPHONE ENCOUNTER
Left a message for the patient trying to make sure she saw the [employer] message regarding her results and see if she had any questions.     HUB TO RELAY

## 2025-05-12 NOTE — PROGRESS NOTES
"Post Operative Visit        Chief Complaint   Patient presents with    Post-op     HPI:   Pain:  no  Vaginal bleeding:  no  Vaginal discharge:  no  Fever/chills:  no    Tissue Pathology Exam (04/16/2025 14:25)  Op Note by Angella Ryan DO (04/16/2025 15:25)    Progress Notes by Angella Ryan DO (02/27/2025 11:30)  History of amenorrhea and Hodgkin's lymphoma, s/p stem cell transplant.  Was told by oncology menses would resume.  Labs ordered in February and not drawn yet.    She has noted decreased sex drive for a while.  She was told once had stem cell transplant all would return back to normal       PHYSICAL EXAM:  /93   Pulse 82   Ht 157.5 cm (62\")   Wt 61.7 kg (136 lb)   BMI 24.87 kg/m²    Chaperone present during breast and/or pelvic exam if performed.  General- NAD, alert and oriented, appropriate  Psych- Normal mood, good memory    External genitalia- Normal, no lesions  Urethra- Normal, no masses, non tender  Bladder- Normal, no masses, non tender  Vagina- NL no atrophy, no discharge   Cervix- LEEP/CKC bed healing well  Uterus- Normal size, shape & consistency.  Non tender, mobile.  Adnexa- No mass, non tender    Lymphatic- No palpable groin nodes  Extremities- No edema, no cyanosis   Skin- No lesions, no rashes    ASSESSMENT and PLAN:  Post-operative exam    Diagnoses and all orders for this visit:    1. Postoperative follow-up (Primary)  Comments:  LEEP    2. Severe dysplasia of cervix (MARILEE III)  Overview:  S/p Gardisil, third dose given early 5/13/2025.  Will need another dose no sooner than 5Aug        Individuals initiating the vaccine series at 15 years of age or older - Three doses of HPV vaccine should be given at 0, 1 to 2 (typically 2), and 6 months.  The minimum intervals between the first two doses is four weeks, between the second and third doses is 12 weeks, and between the first and third dose is five months. If a dose was administered at a shorter interval, it should be repeated once the " minimum recommended interval since the most recent dose has passed.  Feb 2025 HSIL HPV pos- first abn pap.   Colpo MARILEE III cvx bx and ECC +MARILEE III.    Apr 2025 MARILEE III, neg margins.  Rec pap and cotest HPV 12 and 24mo    Orders:  -     HPV Vaccine (HPV9)    3. Hodgkin lymphoma, unspecified Hodgkin lymphoma type, unspecified body region  Overview:  stage IV, dx'd 2009 radiation and chemo  remission to 2011  [facility], last seen 2017.  No FU needed         4. Amenorrhea  Assessment & Plan:  Today we discussed with her history of Hodgkin lymphoma and radiation chemotherapy, this could be etiology of amenorrhea.  Would recommend checking labs, could always consider follow-up with local Oncology for any further recommendations depending on results.  Discussed ovarian hormones, including testosterone, which drives a significant amount of sex drive, would not be unusual to be low if ovaries are not functioning.  Also at her young age may need estrogen supplementation for osteoporosis prevention.    Orders:  -     Estradiol  -     Follicle Stimulating Hormone  -     Prolactin  -     TSH  -     T4, Free        Operative report, surgical findings and any pathology/photos reviewed.  All questions answered.     Counseling:   Ok to resume normal activities  Ok to resume intercourse  Return to school/work without limitations      Follow Up:  Return in about 1 month (around 6/13/2025) for FU labs.            Angella Ryan,   05/13/2025    [facility] [address] [facility]  [address] [place] [address] [address] [place] [address]  Dept: [phone]  Dept Fax: [phone]  Loc: [phone]  Loc Fax: [phone]

## 2025-05-13 ENCOUNTER — OFFICE VISIT (OUTPATIENT)
Dept: OBSTETRICS AND GYNECOLOGY | Age: 35
End: 2025-05-13
Payer: COMMERCIAL

## 2025-05-13 VITALS
DIASTOLIC BLOOD PRESSURE: 93 MMHG | SYSTOLIC BLOOD PRESSURE: 149 MMHG | HEART RATE: 82 BPM | HEIGHT: 62 IN | BODY MASS INDEX: 25.03 KG/M2 | WEIGHT: 136 LBS

## 2025-05-13 DIAGNOSIS — Z09 POSTOPERATIVE FOLLOW-UP: Primary | ICD-10-CM

## 2025-05-13 DIAGNOSIS — D06.9 SEVERE DYSPLASIA OF CERVIX (CIN III): ICD-10-CM

## 2025-05-13 DIAGNOSIS — N91.2 AMENORRHEA: ICD-10-CM

## 2025-05-13 DIAGNOSIS — C81.90 HODGKIN LYMPHOMA, UNSPECIFIED HODGKIN LYMPHOMA TYPE, UNSPECIFIED BODY REGION: ICD-10-CM

## 2025-05-13 LAB
ESTRADIOL SERPL HS-MCNC: <5 PG/ML
FSH SERPL-ACNC: 27.5 MIU/ML
PROLACTIN SERPL-MCNC: 11 NG/ML (ref 4.79–23.3)
T4 FREE SERPL-MCNC: 0.98 NG/DL (ref 0.92–1.68)
TSH SERPL DL<=0.05 MIU/L-ACNC: 2.21 UIU/ML (ref 0.27–4.2)

## 2025-05-13 PROCEDURE — 84439 ASSAY OF FREE THYROXINE: CPT | Performed by: OBSTETRICS & GYNECOLOGY

## 2025-05-13 PROCEDURE — 84146 ASSAY OF PROLACTIN: CPT | Performed by: OBSTETRICS & GYNECOLOGY

## 2025-05-13 PROCEDURE — 82670 ASSAY OF TOTAL ESTRADIOL: CPT | Performed by: OBSTETRICS & GYNECOLOGY

## 2025-05-13 PROCEDURE — 84443 ASSAY THYROID STIM HORMONE: CPT | Performed by: OBSTETRICS & GYNECOLOGY

## 2025-05-13 PROCEDURE — 83001 ASSAY OF GONADOTROPIN (FSH): CPT | Performed by: OBSTETRICS & GYNECOLOGY

## 2025-05-13 NOTE — ASSESSMENT & PLAN NOTE
Today we discussed with her history of Hodgkin lymphoma and radiation chemotherapy, this could be etiology of amenorrhea.  Would recommend checking labs, could always consider follow-up with local Oncology for any further recommendations depending on results.  Discussed ovarian hormones, including testosterone, which drives a significant amount of sex drive, would not be unusual to be low if ovaries are not functioning.  Also at her young age may need estrogen supplementation for osteoporosis prevention.

## 2025-05-14 ENCOUNTER — TELEPHONE (OUTPATIENT)
Dept: OBSTETRICS AND GYNECOLOGY | Age: 35
End: 2025-05-14
Payer: COMMERCIAL

## 2025-05-14 PROBLEM — N91.2 AMENORRHEA: Status: ACTIVE | Noted: 2025-05-14

## 2025-05-14 NOTE — TELEPHONE ENCOUNTER
Laura returned my call and I was able to discuss with her my recommendations as previously noted.  She will reach out to her stem cell transplant surgeon in [place] to get recommendations for specifically what office she should see at [facility]  If she needs help with that she will reach back out to me.  She will keep her office visit with me on 16 June but we will reschedule it to later if for some reason they have not had a chance to see her before that.  I did discuss importance of potentially needing hormone replacement therapy and needing their input.  Questions answered and she desires to proceed as above.    Electronically signed by Angella Ryan DO, 05/14/25, 3:24 PM EDT.

## 2025-05-14 NOTE — TELEPHONE ENCOUNTER
Labs have returned normal except estrogen levels from the ovaries are nonexistent.  This is concerning for because estrogen typically protects a womens heart and bones until avg menopause age of 48yo.  This is more than likely secondary to her treatment with radiation and/or chemotherapy for non-Hodgkin's lymphoma, however it is unusual since her doctor stated after her stem cell transplant she should resume her menses.  I have reached out to a local hematologist oncologist Dr. Jessica Orr who I trust. She feels she should follow up at [facility] clinic who can help answer the question regarding effects of transplant on ovarian function.  They have had additional training and will have likely encountered this situation before or could discuss her case more fully with their tumor board prior to starting her on HRT to replace the lack of estrogen.    Pt contacted, reached VM, message left on her VM to just call me back when convenient for her so I can review the above.    Electronically signed by Angella Ryan DO, 05/14/25, 12:45 PM EDT.

## 2025-05-19 ENCOUNTER — ANESTHESIA EVENT (OUTPATIENT)
Dept: PERIOP | Facility: HOSPITAL | Age: 35
End: 2025-05-19
Payer: COMMERCIAL

## 2025-05-19 NOTE — H&P
PRIMARY CARE PROVIDER: Elmo Castro APRN  REFERRING PROVIDER: Boubacar Johnson MD    CHIEF COMPLAINT:  Preoperative evaluation for surgery    Subjective   History of Present Illness:  Laura Hinton is a  34 y.o.  female who is here for the following problems:    Mass of right parotid gland    Warthin's tumor      She is scheduled for RIGHT PAROTIDECTOMY WITH FACIAL NERVE PRESERVATION, FROZEN SECTION, RECONSTRUCTION WITH PAROTID FASCIAL FLAP, MONITORING OF RIGHT FACIAL NERVE (Right). There has been no significant change in the history since the preoperative office evaluation.     Review of Systems:  CONSTITUTIONAL: no fever or chills  PULMONARY: no cough or shortness of breath  GI: no nausea or vomiting    Past History:  Medical History: has a past medical history of Abnormal Pap smear of cervix (02/10/25), Anemia, Anxiety, History of blood transfusion, HPV (human papilloma virus) infection (02/10/25), Hyperlipidemia (02/10/2025), and Lymphoma in remission.   Surgical History: has a past surgical history that includes Total hip arthroplasty (Bilateral); Total shoulder replacement (Right); and LEEP (N/A, 4/16/2025).   Family History: family history includes Breast cancer in her maternal aunt and niece; Cervical cancer in her mother.   Social History: reports that she quit smoking about 5 years ago. Her smoking use included cigarettes. She started smoking about 17 years ago. She has a 3 pack-year smoking history. She has never used smokeless tobacco. She reports current alcohol use of about 3.0 standard drinks of alcohol per week. She reports that she does not use drugs.  Home Medications:  ferrous sulfate, fluticasone, loratadine, vitamin C, and vitamin D     Allergies: Patient has no known allergies.     Objective     Vital Signs:  Temp:  [99.1 °F (37.3 °C)] 99.1 °F (37.3 °C)  Resp:  [14] 14    Physical Exam:  CONSTITUTIONAL: well nourished, well-developed, alert, oriented, in no acute distress    COMMUNICATION AND VOICE: able to communicate normally, normal voice quality  HEAD: normocephalic, no lesions, atraumatic, no tenderness, no masses   FACE: appearance normal, no lesions, no tenderness, no deformities, facial motion symmetric  EYES: ocular motility normal, eyelids normal, orbits normal, no proptosis, conjunctiva normal , pupils equal, round   EARS:  Hearing: hearing to conversational voice intact bilaterally   External Ears: normal bilaterally, no lesions  NOSE:  External Nose: external nasal structure normal, no tenderness on palpation, no nasal discharge, no lesions, no evidence of trauma, nostrils patent   ORAL:  Lips: upper and lower lips without lesion   NECK:  Inspection and Palpation: neck appearance normal, no masses or tenderness  Right parotid mass 3 cm x 3 cm mobile located in the preauricular region in front of tragus   CHEST/RESPIRATORY: normal respiratory effort   CARDIOVASCULAR: no cyanosis or edema   NEUROLOGICAL/PSYCHIATRIC: oriented to time, place and person, mood normal, affect appropriate, CN II-XII intact grossly    ASSESSMENT:    Mass of right parotid gland    Warthin's tumor    PLAN:  RIGHT PAROTIDECTOMY WITH FACIAL NERVE PRESERVATION, FROZEN SECTION, RECONSTRUCTION WITH PAROTID FASCIAL FLAP, MONITORING OF RIGHT FACIAL NERVE (Right)    1.  Patient has mass in the right parotid gland that has been growing for the last few months.  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  2.  Therefore I recommend patient to undergo right superficial parotidectomy with facial nerve preservation, monitor facial nerve, reconstruction of parotidectomy defect with pedicled fascial flap.     PAROTIDECTOMY: The risks and benefits and alternatives of parotidectomy were explained including but not limited to bleeding, infection, risks of the general anesthesia, pain, partial or total facial nerve injury, either temporary or permanent,  Maribeth's syndrome or so-called gustatory sweating. Operative possibilities including total parotidectomy with or without neck dissection were discussed. Possibilities for delayed need for total parotidectomy and or neck dissection pending pathologic diagnosis were also discussed. The patient understood these risks. Questions were asked appropriately answered. No guarantees were made or implied.     Boubacar Johnson MD  05/20/25  12:02 EDT

## 2025-05-19 NOTE — PRE-PROCEDURE INSTRUCTIONS
PATIENT INSTRUCTED TO BE:    - NOTHING TO EAT AFTER MIDNIGHT OR CHEW, EXCEPT CAN HAVE CLEAR LIQUIDS 2 HOURS PRIOR TO SURGERY ARRIVAL TIME , NO MORE THAN 8 OZ. (NOTHING RED)     - TO HOLD ALL VITAMINS, SUPPLEMENTS, NSAIDS FOR ONE WEEK PRIOR TO THEIR SURGICAL PROCEDURE        - BATHING INSTRUCTIONS GIVEN    INSTRUCTED NO LOTIONS, JEWELRY, PIERCINGS,  NAIL POLISH, OR DEODORANT DAY OF SURGERY        -INSTRUCTED TO TAKE THE FOLLOWING MEDICATIONS THE DAY OF SURGERY WITH SIPS OF WATER:   Claritin, Iron, Flonase        - DO NOT BRING ANY MEDICATIONS WITH YOU TO THE HOSPITAL THE DAY OF SURGERY, EXCEPT IF USE INHALERS. BRING INHALERS DAY OF SURGERY       - BRING CPAP OR BIPAP TO THE HOSPITAL ONLY IF YOU ARE SPENDING THE NIGHT    - DO NOT SMOKE OR VAPE 24 HOURS PRIOR TO PROCEDURE PER ANESTHESIA REQUEST     -MAKE SURE YOU HAVE A RIDE HOME OR SOMEONE TO STAY WITH YOU THE DAY OF THE PROCEDURE AFTER YOU GO HOME     - FOLLOW ANY OTHER INSTRUCTIONS GIVEN TO YOU BY YOUR SURGEON'S OFFICE.     Main Entrance [facility], Take elevator to first floor, turn left and check in at registration     - YOU WILL RECEIVE A PHONE CALL THE DAY PRIOR TO SURGERY BETWEEN 1PM AND 4 PM WITH ARRIVAL TIME, IF YOUR SURGERY IS ON A MONDAY YOU WILL RECEIVE A CALL THE FRIDAY PRIOR TO SURGERY DATE    - BRING CASH OR CREDIT CARD FOR COPAYMENT OF MEDICATIONS AFTER SURGERY IF YOU USE THE HOSPITAL PHARMACY ([facility] TO BED)    - PREADMISSION TESTING NURSE TOM BARDALES [phone] IF HAVE ANY QUESTIONS     -PATIENT PROVIDED THE NUMBER FOR PREOP SURGICAL DEPT IF HAD QUESTIONS AFTER HOURS PRIOR TO SURGERY ([phone]).  INFORMED PT IF NO ANSWER, LEAVE A MESSAGE AND SOMEONE WILL RETURN THEIR CALL       PATIENT VERBALIZED UNDERSTANDING

## 2025-05-20 ENCOUNTER — ANESTHESIA (OUTPATIENT)
Dept: PERIOP | Facility: HOSPITAL | Age: 35
End: 2025-05-20
Payer: COMMERCIAL

## 2025-05-20 ENCOUNTER — HOSPITAL ENCOUNTER (OUTPATIENT)
Facility: HOSPITAL | Age: 35
Setting detail: HOSPITAL OUTPATIENT SURGERY
Discharge: HOME | End: 2025-05-20
Attending: OTOLARYNGOLOGY | Admitting: OTOLARYNGOLOGY
Payer: COMMERCIAL

## 2025-05-20 VITALS
HEART RATE: 79 BPM | WEIGHT: 136 LBS | HEIGHT: 62 IN | RESPIRATION RATE: 16 BRPM | DIASTOLIC BLOOD PRESSURE: 69 MMHG | TEMPERATURE: 97 F | OXYGEN SATURATION: 93 % | SYSTOLIC BLOOD PRESSURE: 103 MMHG | BODY MASS INDEX: 25.03 KG/M2

## 2025-05-20 DIAGNOSIS — D11.9 WARTHIN'S TUMOR: ICD-10-CM

## 2025-05-20 DIAGNOSIS — K11.8 MASS OF RIGHT PAROTID GLAND: ICD-10-CM

## 2025-05-20 DIAGNOSIS — G89.18 POSTOPERATIVE PAIN: Primary | ICD-10-CM

## 2025-05-20 LAB — B-HCG UR QL: NEGATIVE

## 2025-05-20 PROCEDURE — 88307 TISSUE EXAM BY PATHOLOGIST: CPT | Performed by: OTOLARYNGOLOGY

## 2025-05-20 PROCEDURE — 15733 MUSC MYOQ/FSCQ FLP H&N PEDCL: CPT | Performed by: OTOLARYNGOLOGY

## 2025-05-20 PROCEDURE — 81025 URINE PREGNANCY TEST: CPT | Performed by: ANESTHESIOLOGY

## 2025-05-20 PROCEDURE — 25810000003 LACTATED RINGERS PER 1000 ML: Performed by: ANESTHESIOLOGY

## 2025-05-20 PROCEDURE — 25010000002 LIDOCAINE PF 2% 2 % SOLUTION: Performed by: NURSE ANESTHETIST, CERTIFIED REGISTERED

## 2025-05-20 PROCEDURE — 25010000002 PROPOFOL 10 MG/ML EMULSION: Performed by: NURSE ANESTHETIST, CERTIFIED REGISTERED

## 2025-05-20 PROCEDURE — 88331 PATH CONSLTJ SURG 1 BLK 1SPC: CPT | Performed by: OTOLARYNGOLOGY

## 2025-05-20 PROCEDURE — 25010000002 MIDAZOLAM PER 1MG: Performed by: ANESTHESIOLOGY

## 2025-05-20 PROCEDURE — 25010000002 DEXAMETHASONE PER 1 MG: Performed by: NURSE ANESTHETIST, CERTIFIED REGISTERED

## 2025-05-20 PROCEDURE — 25010000002 LIDOCAINE 1% - EPINEPHRINE 1:100000 1 %-1:100000 SOLUTION: Performed by: OTOLARYNGOLOGY

## 2025-05-20 PROCEDURE — 25010000002 HYDROMORPHONE 1 MG/ML SOLUTION

## 2025-05-20 PROCEDURE — 25010000002 FENTANYL CITRATE (PF) 50 MCG/ML SOLUTION: Performed by: NURSE ANESTHETIST, CERTIFIED REGISTERED

## 2025-05-20 PROCEDURE — 42415 EXCISE PAROTID GLAND/LESION: CPT | Performed by: OTOLARYNGOLOGY

## 2025-05-20 PROCEDURE — 25010000002 SUGAMMADEX 200 MG/2ML SOLUTION: Performed by: NURSE ANESTHETIST, CERTIFIED REGISTERED

## 2025-05-20 DEVICE — LIGACLIP MCA MULTIPLE CLIP APPLIERS, 20 SMALL CLIPS
Type: IMPLANTABLE DEVICE | Site: FACE | Status: FUNCTIONAL
Brand: LIGACLIP

## 2025-05-20 RX ORDER — PROMETHAZINE HYDROCHLORIDE 25 MG/1
25 SUPPOSITORY RECTAL ONCE AS NEEDED
Status: DISCONTINUED | OUTPATIENT
Start: 2025-05-20 | End: 2025-05-20 | Stop reason: HOSPADM

## 2025-05-20 RX ORDER — PETROLATUM,WHITE
OINTMENT IN PACKET (GRAM) TOPICAL AS NEEDED
Status: DISCONTINUED | OUTPATIENT
Start: 2025-05-20 | End: 2025-05-20 | Stop reason: SURG

## 2025-05-20 RX ORDER — KETAMINE HYDROCHLORIDE 10 MG/ML
INJECTION, SOLUTION INTRAMUSCULAR; INTRAVENOUS AS NEEDED
Status: DISCONTINUED | OUTPATIENT
Start: 2025-05-20 | End: 2025-05-20 | Stop reason: SURG

## 2025-05-20 RX ORDER — LIDOCAINE HYDROCHLORIDE 20 MG/ML
INJECTION, SOLUTION EPIDURAL; INFILTRATION; INTRACAUDAL; PERINEURAL AS NEEDED
Status: DISCONTINUED | OUTPATIENT
Start: 2025-05-20 | End: 2025-05-20 | Stop reason: SURG

## 2025-05-20 RX ORDER — DEXMEDETOMIDINE HYDROCHLORIDE 100 UG/ML
INJECTION, SOLUTION INTRAVENOUS AS NEEDED
Status: DISCONTINUED | OUTPATIENT
Start: 2025-05-20 | End: 2025-05-20 | Stop reason: SURG

## 2025-05-20 RX ORDER — GINSENG 100 MG
CAPSULE ORAL AS NEEDED
Status: DISCONTINUED | OUTPATIENT
Start: 2025-05-20 | End: 2025-05-20 | Stop reason: HOSPADM

## 2025-05-20 RX ORDER — SODIUM CHLORIDE, SODIUM LACTATE, POTASSIUM CHLORIDE, CALCIUM CHLORIDE 600; 310; 30; 20 MG/100ML; MG/100ML; MG/100ML; MG/100ML
9 INJECTION, SOLUTION INTRAVENOUS CONTINUOUS PRN
Status: DISCONTINUED | OUTPATIENT
Start: 2025-05-20 | End: 2025-05-20 | Stop reason: HOSPADM

## 2025-05-20 RX ORDER — LIDOCAINE HYDROCHLORIDE AND EPINEPHRINE 10; 10 MG/ML; UG/ML
INJECTION, SOLUTION INFILTRATION; PERINEURAL AS NEEDED
Status: DISCONTINUED | OUTPATIENT
Start: 2025-05-20 | End: 2025-05-20 | Stop reason: HOSPADM

## 2025-05-20 RX ORDER — OXYCODONE HYDROCHLORIDE 5 MG/1
5 TABLET ORAL
Status: DISCONTINUED | OUTPATIENT
Start: 2025-05-20 | End: 2025-05-20 | Stop reason: HOSPADM

## 2025-05-20 RX ORDER — ROCURONIUM BROMIDE 10 MG/ML
INJECTION, SOLUTION INTRAVENOUS AS NEEDED
Status: DISCONTINUED | OUTPATIENT
Start: 2025-05-20 | End: 2025-05-20 | Stop reason: SURG

## 2025-05-20 RX ORDER — AZITHROMYCIN 250 MG/1
TABLET, FILM COATED ORAL
Qty: 6 TABLET | Refills: 0 | Status: SHIPPED | OUTPATIENT
Start: 2025-05-20

## 2025-05-20 RX ORDER — DEXAMETHASONE SODIUM PHOSPHATE 4 MG/ML
INJECTION, SOLUTION INTRA-ARTICULAR; INTRALESIONAL; INTRAMUSCULAR; INTRAVENOUS; SOFT TISSUE AS NEEDED
Status: DISCONTINUED | OUTPATIENT
Start: 2025-05-20 | End: 2025-05-20 | Stop reason: SURG

## 2025-05-20 RX ORDER — HYDROCODONE BITARTRATE AND ACETAMINOPHEN 7.5; 325 MG/1; MG/1
1-2 TABLET ORAL EVERY 6 HOURS PRN
Qty: 20 TABLET | Refills: 0 | Status: SHIPPED | OUTPATIENT
Start: 2025-05-20

## 2025-05-20 RX ORDER — ACETAMINOPHEN 500 MG
1000 TABLET ORAL ONCE
Status: COMPLETED | OUTPATIENT
Start: 2025-05-20 | End: 2025-05-20

## 2025-05-20 RX ORDER — PROPOFOL 10 MG/ML
VIAL (ML) INTRAVENOUS AS NEEDED
Status: DISCONTINUED | OUTPATIENT
Start: 2025-05-20 | End: 2025-05-20 | Stop reason: SURG

## 2025-05-20 RX ORDER — ONDANSETRON 2 MG/ML
4 INJECTION INTRAMUSCULAR; INTRAVENOUS ONCE AS NEEDED
Status: DISCONTINUED | OUTPATIENT
Start: 2025-05-20 | End: 2025-05-20 | Stop reason: HOSPADM

## 2025-05-20 RX ORDER — SCOPOLAMINE 1 MG/3D
1 PATCH, EXTENDED RELEASE TRANSDERMAL ONCE
Status: DISCONTINUED | OUTPATIENT
Start: 2025-05-20 | End: 2025-05-20 | Stop reason: HOSPADM

## 2025-05-20 RX ORDER — FENTANYL CITRATE 50 UG/ML
INJECTION, SOLUTION INTRAMUSCULAR; INTRAVENOUS AS NEEDED
Status: DISCONTINUED | OUTPATIENT
Start: 2025-05-20 | End: 2025-05-20 | Stop reason: SURG

## 2025-05-20 RX ORDER — PROMETHAZINE HYDROCHLORIDE 25 MG/1
25 TABLET ORAL ONCE AS NEEDED
Status: DISCONTINUED | OUTPATIENT
Start: 2025-05-20 | End: 2025-05-20 | Stop reason: HOSPADM

## 2025-05-20 RX ORDER — MIDAZOLAM HYDROCHLORIDE 2 MG/2ML
2 INJECTION, SOLUTION INTRAMUSCULAR; INTRAVENOUS ONCE
Status: COMPLETED | OUTPATIENT
Start: 2025-05-20 | End: 2025-05-20

## 2025-05-20 RX ORDER — MAGNESIUM HYDROXIDE 1200 MG/15ML
LIQUID ORAL AS NEEDED
Status: DISCONTINUED | OUTPATIENT
Start: 2025-05-20 | End: 2025-05-20 | Stop reason: HOSPADM

## 2025-05-20 RX ADMIN — PROPOFOL 20 MG: 10 INJECTION, EMULSION INTRAVENOUS at 13:23

## 2025-05-20 RX ADMIN — FENTANYL CITRATE 50 MCG: 50 INJECTION, SOLUTION INTRAMUSCULAR; INTRAVENOUS at 13:01

## 2025-05-20 RX ADMIN — FENTANYL CITRATE 50 MCG: 50 INJECTION, SOLUTION INTRAMUSCULAR; INTRAVENOUS at 12:10

## 2025-05-20 RX ADMIN — PROPOFOL 20 MG: 10 INJECTION, EMULSION INTRAVENOUS at 12:30

## 2025-05-20 RX ADMIN — PROPOFOL 20 MG: 10 INJECTION, EMULSION INTRAVENOUS at 12:45

## 2025-05-20 RX ADMIN — PROPOFOL 175 MCG/KG/MIN: 10 INJECTION, EMULSION INTRAVENOUS at 14:24

## 2025-05-20 RX ADMIN — DEXMEDETOMIDINE 10 MCG: 100 INJECTION, SOLUTION, CONCENTRATE INTRAVENOUS at 13:00

## 2025-05-20 RX ADMIN — PROPOFOL 200 MG: 10 INJECTION, EMULSION INTRAVENOUS at 12:04

## 2025-05-20 RX ADMIN — Medication 1 TUBE: at 12:12

## 2025-05-20 RX ADMIN — PROPOFOL 150 MCG/KG/MIN: 10 INJECTION, EMULSION INTRAVENOUS at 12:05

## 2025-05-20 RX ADMIN — SCOLOPAMINE TRANSDERMAL SYSTEM 1 PATCH: 1 PATCH, EXTENDED RELEASE TRANSDERMAL at 10:38

## 2025-05-20 RX ADMIN — ACETAMINOPHEN 1000 MG: 500 TABLET ORAL at 10:38

## 2025-05-20 RX ADMIN — SUGAMMADEX 100 MG: 100 INJECTION, SOLUTION INTRAVENOUS at 15:53

## 2025-05-20 RX ADMIN — ROCURONIUM BROMIDE 50 MG: 10 INJECTION, SOLUTION INTRAVENOUS at 12:05

## 2025-05-20 RX ADMIN — DEXMEDETOMIDINE 8 MCG: 100 INJECTION, SOLUTION, CONCENTRATE INTRAVENOUS at 15:44

## 2025-05-20 RX ADMIN — PROPOFOL 175 MCG/KG/MIN: 10 INJECTION, EMULSION INTRAVENOUS at 15:08

## 2025-05-20 RX ADMIN — SUGAMMADEX 100 MG: 100 INJECTION, SOLUTION INTRAVENOUS at 12:23

## 2025-05-20 RX ADMIN — MIDAZOLAM HYDROCHLORIDE 2 MG: 1 INJECTION, SOLUTION INTRAMUSCULAR; INTRAVENOUS at 11:42

## 2025-05-20 RX ADMIN — PROPOFOL 175 MCG/KG/MIN: 10 INJECTION, EMULSION INTRAVENOUS at 12:43

## 2025-05-20 RX ADMIN — PROPOFOL 20 MG: 10 INJECTION, EMULSION INTRAVENOUS at 14:36

## 2025-05-20 RX ADMIN — DEXAMETHASONE SODIUM PHOSPHATE 4 MG: 4 INJECTION, SOLUTION INTRAMUSCULAR; INTRAVENOUS at 12:13

## 2025-05-20 RX ADMIN — LIDOCAINE HYDROCHLORIDE 80 MG: 20 INJECTION, SOLUTION INTRAVENOUS at 12:07

## 2025-05-20 RX ADMIN — PROPOFOL 20 MG: 10 INJECTION, EMULSION INTRAVENOUS at 12:56

## 2025-05-20 RX ADMIN — SODIUM CHLORIDE, POTASSIUM CHLORIDE, SODIUM LACTATE AND CALCIUM CHLORIDE 9 ML/HR: 600; 310; 30; 20 INJECTION, SOLUTION INTRAVENOUS at 10:37

## 2025-05-20 RX ADMIN — FENTANYL CITRATE 50 MCG: 50 INJECTION, SOLUTION INTRAMUSCULAR; INTRAVENOUS at 13:31

## 2025-05-20 RX ADMIN — PROPOFOL 175 MCG/KG/MIN: 10 INJECTION, EMULSION INTRAVENOUS at 13:09

## 2025-05-20 RX ADMIN — HYDROMORPHONE HYDROCHLORIDE 0.5 MG: 1 INJECTION, SOLUTION INTRAMUSCULAR; INTRAVENOUS; SUBCUTANEOUS at 15:44

## 2025-05-20 RX ADMIN — KETAMINE HYDROCHLORIDE 10 MG: 10 INJECTION INTRAMUSCULAR; INTRAVENOUS at 12:44

## 2025-05-20 RX ADMIN — DEXMEDETOMIDINE 10 MCG: 100 INJECTION, SOLUTION, CONCENTRATE INTRAVENOUS at 12:48

## 2025-05-20 RX ADMIN — FENTANYL CITRATE 50 MCG: 50 INJECTION, SOLUTION INTRAMUSCULAR; INTRAVENOUS at 12:05

## 2025-05-20 RX ADMIN — DEXMEDETOMIDINE 10 MCG: 100 INJECTION, SOLUTION, CONCENTRATE INTRAVENOUS at 14:47

## 2025-05-20 RX ADMIN — FENTANYL CITRATE 50 MCG: 50 INJECTION, SOLUTION INTRAMUSCULAR; INTRAVENOUS at 14:38

## 2025-05-20 RX ADMIN — PROPOFOL 175 MCG/KG/MIN: 10 INJECTION, EMULSION INTRAVENOUS at 13:36

## 2025-05-20 RX ADMIN — SODIUM CHLORIDE, POTASSIUM CHLORIDE, SODIUM LACTATE AND CALCIUM CHLORIDE: 600; 310; 30; 20 INJECTION, SOLUTION INTRAVENOUS at 13:34

## 2025-05-20 RX ADMIN — KETAMINE HYDROCHLORIDE 20 MG: 10 INJECTION INTRAMUSCULAR; INTRAVENOUS at 12:32

## 2025-05-20 NOTE — INTERVAL H&P NOTE
H&P updated. The patient was examined and the following changes are noted:        Vital signs have been updated

## 2025-05-20 NOTE — DISCHARGE INSTRUCTIONS
DISCHARGE INSTRUCTIONS  SURGICAL / AMBULATORY  PROCEDURES      For your surgery you had:  General anesthesia (you may have a sore throat for the first 24 hours)  IV sedation.  Local anesthesia  Monitored anesthesia Care  You received a medicated patch for nausea prevention today (behind your ear). It is recommended that you remove it 24-48 hours post-operatively. It must be removed within 72 hours.   You have received an anesthesia medication today that can cause hormonal forms of birth control to be ineffective. You should use a different form of birth control (to prevent pregnancy) for 7 days.   You may experience dizziness, drowsiness, or light-headedness for several hours following surgery/procedure.  Do not stay alone today or tonight.  Limit your activity for 24 hours.  Resume your diet slowly.  Follow whatever special dietary instructions you may have been given by your doctor.  You should not drive or operate machinery, drink alcohol, or sign legally binding documents for 24 hours or while you are taking pain medication.  Last dose of pain medication was given at:   .  NOTIFY YOUR DOCTOR IF YOU EXPERIENCE ANY OF THE FOLLOWING:  Temperature greater than 101 degrees Fahrenheit  Shaking Chills  Redness or excessive drainage from incision  Nausea, vomiting and/or pain that is not controlled by prescribed medications  Increase in bleeding or bleeding that is excessive  Unable to urinate in 6 hours after surgery  If unable to reach your doctor, please go to the closest Emergency Room    You may remove Band-Aid/dressing tomorrow.  You may shower or bathe   .  Apply an ice pack 24-48 hours.  Medications per physician instructions as indicated on Discharge Medication Information Sheet.    SPECIAL INSTRUCTIONS:  1.  IF GOING HOME WITH DRAIN, CLEAN AROUND THE DRAIN SITE ALONG WITH THE INCISION WITH HYDROGEN PEROXIDE AND APPLY BACITRACIN 3X PER DAY   2.  IF DRAINAGE IS HALF FULL, DRAIN THE GRENADE BULB AS INSTRUCTED  AND RELOAD SUCTION AS INSTRUCTED.   3.  IF DRAINAGE IS LESS THAN 5 MILLILITER OR LESS, CALL OFFICE FOR DRAIN REMOVAL [phone]   Notify Dr. Toi Johnson if there is any drain malfunction or facial nerve weakness.     Keep incision dry until drain is removed and drain hole is sealed.

## 2025-05-20 NOTE — ANESTHESIA PREPROCEDURE EVALUATION
Anesthesia Evaluation     Patient summary reviewed and Nursing notes reviewed   no history of anesthetic complications:   NPO Solid Status: > 8 hours  NPO Liquid Status: > 2 hours           Airway   Mallampati: II  TM distance: >3 FB  Neck ROM: full  No difficulty expected  Dental - normal exam     Pulmonary - normal exam    breath sounds clear to auscultation  (+) a smoker (quit in 2009) Former,  Cardiovascular - normal exam  Exercise tolerance: good (4-7 METS)    Rhythm: regular  Rate: normal    (+) hyperlipidemia      Neuro/Psych- negative ROS  GI/Hepatic/Renal/Endo - negative ROS     Musculoskeletal     Abdominal    Substance History - negative use     OB/GYN negative ob/gyn ROS         Other   arthritis,   history of cancer (lymphoma in remission)    ROS/Med Hx Other: HX LYMPHOMA/REMISSION, HPV 2/25,HLD. KT                Anesthesia Plan    ASA 2     general     (Patient understands anesthesia not responsible for dental damage.)  intravenous induction     Anesthetic plan, risks, benefits, and alternatives have been provided, discussed and informed consent has been obtained with: patient.    Use of blood products discussed with patient .    Plan discussed with CRNA.    CODE STATUS:

## 2025-05-20 NOTE — INTERVAL H&P NOTE
H&P updated. The patient was examined and the following changes are noted:  Vital signs are updated

## 2025-05-20 NOTE — ANESTHESIA POSTPROCEDURE EVALUATION
Patient: Laura Hinton    Procedure Summary       Date: 05/20/25 Room / Location: [facility] OR 09 / [facility] MAIN OR    Anesthesia Start: 1152 Anesthesia Stop: 1617    Procedure: RIGHT PAROTIDECTOMY WITH FACIAL NERVE PRESERVATION, FROZEN SECTION, RECONSTRUCTION WITH PAROTID FASCIAL FLAP, MONITORING OF RIGHT FACIAL NERVE (Right: Neck) Diagnosis:       Mass of right parotid gland      Warthin's tumor      (Mass of right parotid gland [K11.8])      (Warthin's tumor [D11.9])    Surgeons: Boubacar Johnson MD Provider: Raudel Izquierdo MD    Anesthesia Type: general ASA Status: 2            Anesthesia Type: general    Vitals  Vitals Value Taken Time   BP 91/54 05/20/25 16:52   Temp 36.2 °C (97.2 °F) 05/20/25 16:10   Pulse 70 05/20/25 16:56   Resp 12 05/20/25 16:50   SpO2 96 % 05/20/25 16:56   Vitals shown include unfiled device data.        Post Anesthesia Care and Evaluation    Patient location during evaluation: bedside  Patient participation: complete - patient participated  Level of consciousness: awake  Pain management: adequate    Airway patency: patent  PONV Status: none  Cardiovascular status: acceptable and stable  Respiratory status: acceptable  Hydration status: acceptable

## 2025-05-21 NOTE — PROGRESS NOTES
Patient Name: Laura Hinton   Visit Date: 05/23/2025   Patient ID: [id number]  Provider: CANDIDO Stewart    Sex: female  Location: [facility]   YOB: 1990  Location Address: [address] [place] [address], [address] [place] [address],?[place]?[id number]    Primary Care Provider Elmo Castro APRN  Location Phone: [phone]    Referring Provider: No ref. provider found        Chief Complaint  Post-op (DRAIN REMOVAL)    History of Present Illness  Laura Hinton is a 34 y.o. female who presents to [facility] for Post-op (DRAIN REMOVAL)  Patient previously seen by Dr. Johnson with last office visit being on 4/3/25 for evaluation of right parotid mass.  Previous ultrasound had and neck showed 1.7 cm heterogeneous partially cystic mass with interval vascularity with recommendations for biopsy.  On 3/21/2025 patient underwent ultrasound-guided FNA of right parotid mass.  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  5/20/2025 patient underwent the following procedure per Dr. Johnson: RIGHT PAROTIDECTOMY WITH FACIAL NERVE PRESERVATION, FROZEN SECTION, RECONSTRUCTION WITH PAROTID FASCIAL FLAP, MONITORING OF RIGHT FACIAL NERVE   Postoperative facial nerve function was intact.  SURGICAL TISSUE PATHOLOGY: Pending  History of Present Illness  The patient presents for evaluation status post surgery.    She reports persistent right facial drooping, with an inability to fully smileand a sensation of numbness. Additionally, she experiences difficulty in closing her eye, which is slightly dry. Her mouth remains partially open when she smiles. She has not experienced any fevers. The drain output was 10 mL on the first night, 7 mL on the second night, and 5 mL on the last two occasions. There is a concern expressed about a potential hair entanglement in the stitch and a swollen area at the base of her ear, which they are hesitant to clean due to the presence of  "stitches. She inquires about the possibility of showering and whether additional stitching will be required for her right ear after drain removal. She also questions the necessity of further antibiotic treatment.    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  Removing abnormal cells on the cervix;  Surgeon: Angella Ryan DO;  Location: [facility];  Service: Gynecology;  Laterality: N/A;    PAROTIDECTOMY Right 5/20/2025    Procedure: RIGHT PAROTIDECTOMY WITH FACIAL NERVE PRESERVATION, FROZEN SECTION, RECONSTRUCTION WITH PAROTID FASCIAL FLAP, MONITORING OF RIGHT FACIAL NERVE;  Surgeon: Boubacar Johnson MD;  Location: [facility] OR;  Service: ENT;  Laterality: Right;    TOTAL HIP ARTHROPLASTY Bilateral     TOTAL SHOULDER REPLACEMENT Right          Current Outpatient Medications:     azithromycin (ZITHROMAX) 250 MG tablet, Take 2 tablets by mouth the first day, then 1 tablet daily for 4 days., Disp: 6 tablet, Rfl: 0    HYDROcodone-acetaminophen (NORCO) 7.5-325 MG per tablet, Take 1-2 tablets by mouth Every 6 (Six) Hours As Needed for Moderate Pain (Pain)., Disp: 20 tablet, Rfl: 0    loratadine (CLARITIN) 10 MG tablet, Take 1 tablet by mouth Daily., Disp: 90 tablet, Rfl: 1    ferrous sulfate 325 (65 FE) MG EC tablet, Take 1 tablet by mouth Daily With Breakfast. (Patient not taking: Reported on 5/23/2025), Disp: 90 tablet, Rfl: 0    fluticasone (FLONASE) 50 MCG/ACT nasal spray, Administer 2 sprays into the nostril(s) as directed " by provider Daily. 2 sprays each nostril daily (Patient not taking: Reported on 2025), Disp: 16 g, Rfl: 1    vitamin C (ASCORBIC ACID) 500 MG tablet, Take 1 tablet by mouth Daily. (Patient not taking: Reported on 2025), Disp: 90 tablet, Rfl: 0    vitamin D (ERGOCALCIFEROL) 1.25 MG (57155 UT) capsule capsule, Take 1 capsule by mouth 1 (One) Time Per Week. (Patient not taking: Reported on 2025), Disp: 13 capsule, Rfl: 0     No Known Allergies    Social History     Tobacco Use    Smoking status: Former     Current packs/day: 0.00     Average packs/day: 0.3 packs/day for 12.0 years (3.0 ttl pk-yrs)     Types: Cigarettes     Start date: 2008     Quit date:      Years since quittin.3     Passive exposure: Past    Smokeless tobacco: Never   Vaping Use    Vaping status: Never Used   Substance Use Topics    Alcohol use: Yes     Alcohol/week: 3.0 standard drinks of alcohol     Types: 3 Cans of beer per week    Drug use: Never        Objective     Tobacco Use: Medium Risk (2025)    Patient History     Smoking Tobacco Use: Former     Smokeless Tobacco Use: Never     Passive Exposure: Past         Physical Exam    Constitutional   Appearance  well developed, well-nourished, alert and in no acute distress, voice clear and strong    Head   Inspection  no deformities or lesions, atraumatic    Face   Inspection  no facial lesions; House-Brackmann 4/VI on right side and 1/VI on left side   Palpation  no TMJ crepitus nor  muscle tenderness bilaterally     Eyes/Vision   Visual Fields  extraocular movements intact, inability to totally close right eye  Conjunctivae  Mildly erythematous   Sclerae  clear   Pupils and Irises  pupils equal, round, and reactive to light.   Nystagmus  not present     Ears, Nose, Mouth and Throat  Ears  External Ears  Auricles appearance normal  Preauricular sutures intact without erythema or purulence  Postauricular drain intact with minimal serosanguineous  drainage  Postauricular and subauricular sutures intact without purulence, erythema, signs of infection with mild edema throughout all suture lines  Otoscopic Examination  tympanic membrane appearance within normal limits bilaterally without perforations, well-aerated middle ears without evidence of effusion  Hearing  intact to conversational voice both ears   Tunning fork testing    Rinne:  Irving:    Nose  External Nose  appearance normal   Intranasal Exam  mucosa within normal limits, vestibules normal, no intranasal lesions present, septum midline, sinuses non tender to percussion   Modified Ta Test:    Oral Cavity  Oral Mucosa  oral mucosa normal without pallor or cyanosis   Stensen's and Warthin's ducts are productive and patent with clear saliva  Lips  lip appearance normal   Teeth  normal dentition for age   Gums  gums pink, non-swollen, no bleeding present   Tongue  tongue appearance normal; normal mobility   Palate  hard palate normal, soft palate appearance normal with symmetric mobility     Throat  Oropharynx  no inflammation or lesions present  Tonsils  Bilateral tonsils unremarkable  Hypopharynx  appearance within normal limits   Larynx  voice normal     Neck  Inspection/Palpation  normal appearance, no masses or tenderness, trachea midline; thyroid size normal, nontender, no nodules or masses present on palpation     Lymphatic  Neck  no lymphadenopathy present   Supraclavicular Nodes  no lymphadenopathy present   Preauricular Nodes  no lymphadenopathy present     Respiratory  Respiratory Effort  breathing unlabored   Inspection of Chest  normal appearance, no retractions     Musculoskeletal   Cervical back: Normal range of motion and neck supple.      Skin and Subcutaneous Tissue  General Inspection  Regarding face and neck - there are no rashes present, no lesions present, and no areas of discoloration     Neurologic  Cranial Nerves  Alert and oriented x3  cranial nerves II-XII are grossly intact  bilaterally   Gait and Station  normal gait, able to stand without diffculty    Psychiatric  Judgement and Insight  judgment and insight intact   Mood and Affect  mood normal, affect appropriate       RESULTS REVIEWED    I have reviewed the following information:   [x]  Previous Internal Note  []  Previous External Note:   [x]  Ordered Tests & Results:      Pathology:   Lab Results   Component Value Date    Microscopic Description  05/20/2025     Microscopic examination performed.         TSH   Date Value Ref Range Status   05/13/2025 2.210 0.270 - 4.200 uIU/mL Final     Free T4   Date Value Ref Range Status   05/13/2025 0.98 0.92 - 1.68 ng/dL Final     PTH, Intact   Date Value Ref Range Status   06/29/2023 26.0 15.0 - 65.0 pg/mL Final     Calcium   Date Value Ref Range Status   02/07/2025 9.9 8.6 - 10.5 mg/dL Final   06/03/2020 8.9 8.4 - 10.2 mg/dL Final     25 Hydroxy, Vitamin D   Date Value Ref Range Status   02/07/2025 23.1 (L) 30.0 - 100.0 ng/ml Final       US Guided Salivary Gland Biopsy  Result Date: 3/21/2025  Impression: Technically successful fine-needle aspiration of a 1.8 cm right parotid lobe lesion. The lesion appears largely cystic or necrotic which may make pathologic diagnosis difficult. Electronically Signed: Arik Fam MD  3/21/2025 5:09 PM EDT  Workstation ID: [id number]    US Head Neck Soft Tissue  Result Date: 2/20/2025  Impression: In the region of the right parotid, there is a 1.7 cm heterogeneous part cystic mass with internal vascularity. This could be a primary parotid lesion. Recommend ultrasound-guided biopsy to further evaluate. Electronically Signed: Manjeet Cota MD  2/20/2025 4:08 PM EST  Workstation ID: [id number]          I have discussed the interpretation of the above results with the patient.    Foreign Body Removal    Date/Time: 5/23/2025 10:34 AM    Performed by: Sherrie Trevino APRN  Authorized by: Sherrie Trevino APRN  Body area: ear  Location details: right  ear    Sedation:  Patient sedated: no    Patient restrained: no  Patient cooperative: yes  Localization method: visualized  Complexity: simple  Objects recovered: Right postauricular RANDA drain  Post-procedure assessment: foreign body removed  Patient tolerance: patient tolerated the procedure well with no immediate complications  Comments: Right postauricular drain removed in office today without complication.  Suture lines and open drain site cleansed with hydrogen peroxide and then covered with antibiotic ointment.              Assessment and Plan   Diagnoses and all orders for this visit:    1. Mass of right parotid gland (Primary)  -     Foreign Body Removal    2. Warthin's tumor  -     Foreign Body Removal        Assessment & Plan  1. Postoperative status.  Surgical pathology results are pending. The suture line is healing well, and there is no evidence of infection. The drain was removed during this visit.  Moisturizer should be applied to the eye frequently, and tape should be used at night to ensure it remains closed. Regular use of moisturizer drops was recommended.  Eyepatch discussed. avoid direct water contact with the incision site until it fully seals. Gentle cleaning of the area with a Q-tip was suggested. Practice closing the eyes and smiling to stimulate nerve function. The focus for the first week is on healing, with functional recovery to be addressed subsequently. The surgical pathology results will be discussed at the next appointment.    PROCEDURE  Procedure: Drain Removal    All questions were answered and agreement to proceed was given after the following Pre-Procedure details were reviewed:  - Consent: Verbal consent obtained    Intra-Procedure:  - Dressing: Application of ointment over the site    Post-Procedure:  - Tolerance Level: Tolerated well  - Complications: None  - Home Care Instructions: Keep the incision site clean, avoid direct water exposure until the site seals off, use  moisturizer drops for the eye, and tape the eye closed at night to ensure proper closure.      (K11.8) Mass of right parotid gland - Plan: Foreign Body Removal    (D11.9) Warthin's tumor - Plan: Foreign Body Removal     Laura Hinton  reports that she quit smoking about 5 years ago. Her smoking use included cigarettes. She started smoking about 17 years ago. She has a 3 pack-year smoking history. She has been exposed to tobacco smoke. She has never used smokeless tobacco.     Plan:  Patient Instructions   1.  Postop drain removal performed today without complication.  2.  Patient having right facial paralysis with inability to get complete closure of the right eye.  Discussed frequent eye moisturizer, instructed on taping eye at night and eyepatch discussed.  3.  All sutures healing well.  Pathology report to be discussed at next scheduled visit with Dr. Johnson.      Follow Up   No follow-ups on file.  Patient was given instructions and counseling regarding her condition or for health maintenance advice. Please see specific information pulled into the AVS if appropriate.      Patient or patient representative verbalized consent for the use of Ambient Listening during the visit with  CANDIDO Stewart for chart documentation. 5/23/2025  10:37 EDT    All or a portion of this Note was dictated utilizing Dragon Dictation.

## 2025-05-21 NOTE — OP NOTE
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  Therefore patient was recommended RIGHT PAROTIDECTOMY WITH FACIAL NERVE PRESERVATION, FROZEN SECTION, RECONSTRUCTION WITH PAROTID FASCIAL FLAP, MONITORING OF RIGHT FACIAL NERVE.  All the risks, benefits, and alternatives were discussed with the patient.  Patient understands and wants to proceed with the planned procedures.    Pre-op Diagnosis:   Mass of right parotid gland [K11.8]  Warthin's tumor [D11.9]    Post-Op Diagnosis Codes:     * Mass of right parotid gland [K11.8]     * Warthin's tumor [D11.9]       Procedure/CPT® Codes:  RI EXC PRTD JEAN/PRTD GLND LAT DSJ&PRSRV FACIAL NR [41394]  RI NEEDLE ELECTROMYOGRAPHY CRANIAL NRV MUSCLE UNI [67408]  RI IONM 1 ON 1 IN OR W/ATTENDANCE EACH 15 MINUTES [52120]  RI MUSC MYOQ/FSCQ FLAP HEAD&NECK W/NAMED VASC PEDCL [88936]    1.  RIGHT PAROTIDECTOMY WITH FACIAL NERVE PRESERVATION AND FROZEN SECTION   2.  RECONSTRUCTION WITH PEDICLED PREPAROTID FASCIAL FLAP  3.  MONITORING OF RIGHT FACIAL NERVE (START 12:28 END 15:52)    Staff:  Boubacar Johnson MD  No responsible provider has been recorded for the case.    Circulator: Kylie Mitchell RN; Paige Ashford RN; Misa Leija RN  Scrub Person: Zeb Hoffman; Carlota Vides; Anjali Amor     Anesthesia: General    Estimated Blood Loss: 25 mL    Implants:    Implant Name Type Inv. Item Serial No.  Lot No. LRB No. Used Action   CLIPAPPLR M/ ENDO LIGACLIP 9 3/8IN SM - [id number] Implant CLIPAPPLR M/ ENDO LIGACLIP 9 3/8IN SM  [employer]  DIV [employer] J [id number] Right 1 Implanted   CLIPAPPLR M/ ENDO LIGACLIP 9 3/8IN SM - [id number] Implant CLIPAPPLR M/ ENDO LIGACLIP 9 3/8IN SM  [employer]  DIV OF J [employer] J [id number]  Right 1 Implanted       Specimen:          Specimens       ID Source Type Tests Collected By Collected At Frozen?    A Parotid Tissue TISSUE PATHOLOGY EXAM   Boubacar Johnson MD 5/20/25 0435 Yes    Description: Right Parotid Mass    Comment: Fresh for frozen  2.5x2  Rm #[id number]              Findings:   1.  Right parotid mass 2.5 cm x 2 cm sitting in the middle of parotid gland very firm to touch and facial nerve both upper and lower division with branches displaced medially to the tumor requiring extensive tedious dissection in order to preserve all branches of the facial nerve.  2.  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  However final pathology is pending permanents.  3.  After resection of the tumor, all branches of the facial nerve was stimulated and noted to be in good response with the stimulating probe.  4.  Not having clear-cut final diagnosis, it was decided not to proceed with total parotidectomy.  5.  Parotidectomy defect with facial nerve exposed was covered and reconstructed in order to fill the defect using 3 parotid pedicled fascial flap with the parotid tissue.  6.  10 [place] drain was placed in the parotidectomy bed over the fascia in order to prevent injury to the facial nerve branches.      Complications: None    Description of Procedure:   Patient was taken to the operating room and general endotracheal anesthesia was performed in supine position.  After patient had been adequately anesthetized an endotracheal tube secured, table was turned.  Patient's right parotid area was draped with ten 10 drape after which facial nerve monitoring electrodes were placed and hooked up to nerve monitor for continuous nerve monitoring.  Patient was then prepped with Betadine and draped in the usual sterile manner.  Incision line was drawn preoperatively and a modified [place] incision with the incision extended around  the tragus mimicking facelift incision.  This area was then infiltrated with 1% Xylocaine with 1-100,000 epinephrine.  Then subsequently with the facial nerve monitoring electrodes in place and working order incision was made and gan marks were placed for later closure.  Otherwise parotidectomy flap was raised at the preparotid fascial layer as far anterior as possible beyond the mass.  Then [place] retractor was placed and retraction devices with hooks were placed appropriately for maximal exposure.  Additional retraction was performed anteriorly with [place] for additional dissection anterior to the mass.  However it was decided to go ahead and find the root of the facial nerve first which was done by finding the tragal root dissecting in the posterior margins of the parotid gland.  About 1 cm below the tragal pointer facial nerve was stimulated and then further identified the root.  This was further dissected anteriorly where both the upper and lower division of the nerve was identified.  Then suddenly the divisions branch that were more branches of the buccal and zygomatic as well as marginal branches.  The each 1 of these branches were followed and preserved while also performing a pedicled prefascial flap that is hinged superiorly for later reconstruction.  It is obvious that the nerves with these branches have been displaced deeper medially by the tumor and it was further followed trying to keep the capsule of the mass intact while dissecting the entire tumor with a little bit of cuff of parotid tissue around it especially anteriorly and superiorly.  Finally entire tumor was excised with capsule intact measuring 2.5 cm x 2 cm.  This was sent for frozen section.  In the meantime all branches have been stimulated with the nerve stimulating probe and was noted to be unremarkable with good response.  At this point wound was copiously irrigated with sterile saline solution.  This point frozen section  returned however there was some concern as to whether this may be a mucosal epidermoid carcinoma but probably low-grade however pathologist could not call it until final pathology evaluation is done with permanent.  Therefore it was decided not to proceed with total parotidectomy.  Instead specimen was oriented for the pathologist and also to look at all the margins for the permanent.  Drain was then hooked up to grenade suction.  Subsequently bacitracin was applied along the incisions.  Patient was then extubated and transported to recovery room in good condition with facial nerve and working order.      Boubacar Johnson MD     Date: 5/21/2025  Time: 19:09 EDT

## 2025-05-22 LAB
CYTO UR: NORMAL
LAB AP CASE REPORT: NORMAL
LAB AP CLINICAL INFORMATION: NORMAL
Lab: NORMAL
PATH REPORT.FINAL DX SPEC: NORMAL
PATH REPORT.GROSS SPEC: NORMAL

## 2025-05-23 ENCOUNTER — OFFICE VISIT (OUTPATIENT)
Dept: OTOLARYNGOLOGY | Facility: CLINIC | Age: 35
End: 2025-05-23
Payer: COMMERCIAL

## 2025-05-23 ENCOUNTER — TELEPHONE (OUTPATIENT)
Dept: OTOLARYNGOLOGY | Facility: CLINIC | Age: 35
End: 2025-05-23

## 2025-05-23 VITALS
SYSTOLIC BLOOD PRESSURE: 134 MMHG | DIASTOLIC BLOOD PRESSURE: 85 MMHG | WEIGHT: 136 LBS | HEIGHT: 62 IN | HEART RATE: 71 BPM | BODY MASS INDEX: 25.03 KG/M2 | TEMPERATURE: 97.5 F

## 2025-05-23 DIAGNOSIS — K11.8 MASS OF RIGHT PAROTID GLAND: Primary | ICD-10-CM

## 2025-05-23 DIAGNOSIS — D11.9 WARTHIN'S TUMOR: ICD-10-CM

## 2025-05-23 DIAGNOSIS — H02.203: ICD-10-CM

## 2025-05-23 DIAGNOSIS — G51.0 FACIAL PARALYSIS ON RIGHT SIDE: ICD-10-CM

## 2025-05-23 RX ORDER — METHYLPREDNISOLONE 4 MG/1
TABLET ORAL
Qty: 1 EACH | Refills: 0 | Status: SHIPPED | OUTPATIENT
Start: 2025-05-23 | End: 2025-05-23 | Stop reason: ALTCHOICE

## 2025-05-23 RX ORDER — PREDNISONE 20 MG/1
20 TABLET ORAL 2 TIMES DAILY
Qty: 14 TABLET | Refills: 0 | Status: SHIPPED | OUTPATIENT
Start: 2025-05-23 | End: 2025-05-30

## 2025-05-23 RX ORDER — MINERAL OIL AND WHITE PETROLATUM 150; 830 MG/G; MG/G
1 OINTMENT OPHTHALMIC
Qty: 3.5 G | Refills: 2 | Status: SHIPPED | OUTPATIENT
Start: 2025-05-23

## 2025-05-23 NOTE — PATIENT INSTRUCTIONS
1.  Postop drain removal performed today without complication.  2.  Patient having right facial paralysis with inability to get complete closure of the right eye.  Discussed frequent eye moisturizer, instructed on taping eye at night and eyepatch discussed.  3.  All sutures healing well.  Pathology report to be discussed at next scheduled visit with Dr. Johnson.  4.  After reviewing case with Dr. Johnson, we decided to go ahead and add on a round of steroids for the patient as this is a postsurgical room and it may help with inflammatory causes of her facial paralysis.

## 2025-05-29 ENCOUNTER — OFFICE VISIT (OUTPATIENT)
Dept: OTOLARYNGOLOGY | Facility: CLINIC | Age: 35
End: 2025-05-29
Payer: COMMERCIAL

## 2025-05-29 VITALS
TEMPERATURE: 98 F | HEIGHT: 62 IN | BODY MASS INDEX: 25.03 KG/M2 | HEART RATE: 71 BPM | WEIGHT: 136 LBS | SYSTOLIC BLOOD PRESSURE: 149 MMHG | DIASTOLIC BLOOD PRESSURE: 85 MMHG

## 2025-05-29 DIAGNOSIS — K11.8 MASS OF RIGHT PAROTID GLAND: ICD-10-CM

## 2025-05-29 DIAGNOSIS — G51.0 FACIAL PARALYSIS ON RIGHT SIDE: Primary | ICD-10-CM

## 2025-05-29 PROCEDURE — 99213 OFFICE O/P EST LOW 20 MIN: CPT | Performed by: OTOLARYNGOLOGY

## 2025-05-29 NOTE — PATIENT INSTRUCTIONS
1.  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  However final diagnosis and pathology is still pending second opinion from [facility].  2.  Patient has very mild right facial weakness which she had already been treated with Medrol Dosepak.  However at this point I think it is probably nerve injury from dissection.  I recommend patient to do biofeedback with exercises.  3.  Patient has follow-up appointment on June 26 and at that time we will reassess the facial function but also to go over the pathology report if I was able to get the results sooner I will try to contact the patient.

## 2025-05-29 NOTE — PROGRESS NOTES
Patient Name: Laura Hinton   Visit Date: 05/29/2025   Patient ID: [id number]  Provider: Boubacar Johnson MD    Sex: female  [place] [facility]   YOB: 1990  Location Address: [address] [place] [address], [address] [place] [address],?[place]?[id number]    Primary Care Provider Elmo Castro, APRN  Location Phone: [phone]    Referring Provider: No ref. provider found        Chief Complaint  1 week post op suture removal, parotid mass right side, and warthin's tumor    History of Present Illness  Laura Hinton is a 34 y.o. female who presents to [facility] for 1 week post op suture removal, parotid mass right side, and warthin's tumor.    Patient was followed by Ms. Elmo Castro for routine annual physical examination on 2/7/2025 and noted a right preauricular mass noted to be parotid mass which she further worked up with ultrasound of head and neck Showing 1.7 cm heterogeneous partially cystic mass with internal vascularity recommending biopsy.  On 3/21/2025 patient underwent ultrasound-guided biopsy of the right parotid mass.  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  Patient was then sent to Dr. Thompson for evaluation and was subsequently referred to ENT for further evaluation and surgical management.   Patient underwent following procedure on 5/20/2025.    1.  RIGHT PAROTIDECTOMY WITH FACIAL NERVE PRESERVATION AND FROZEN SECTION   2.  RECONSTRUCTION WITH PEDICLED PREPAROTID FASCIAL FLAP  3.  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  Removing abnormal cells on the cervix;  Surgeon: Angella Ryan DO;  Location: [facility] [place];  Service: Gynecology;  Laterality: N/A;    PAROTIDECTOMY Right 5/20/2025    Procedure: RIGHT PAROTIDECTOMY WITH FACIAL NERVE PRESERVATION, FROZEN SECTION, RECONSTRUCTION WITH PAROTID FASCIAL FLAP, MONITORING OF RIGHT FACIAL NERVE;  Surgeon: Boubacar Johnson MD;  Location: [facility];  Service: ENT;  Laterality: Right;    TOTAL HIP ARTHROPLASTY Bilateral     TOTAL SHOULDER REPLACEMENT Right          Current Outpatient Medications:     artificial tears (LUBRIFRESH P.M.) ointment ophthalmic ointment, Administer 1 Application to the right eye Every 1 (One) Hour As Needed (for eye dryness as needed)., Disp: 3.5 g, Rfl: 2    azithromycin (ZITHROMAX) 250 MG tablet, Take 2 tablets by mouth the first day, then 1 tablet daily for 4 days., Disp: 6 tablet, Rfl: 0    HYDROcodone-acetaminophen (NORCO) 7.5-325 MG per tablet, Take 1-2 tablets by mouth Every 6 (Six) Hours As Needed for Moderate Pain (Pain)., Disp: 20 tablet, Rfl: 0    loratadine (CLARITIN) 10 MG tablet, Take 1 tablet by mouth Daily., Disp: 90 tablet, Rfl: 1    predniSONE (DELTASONE) 20 MG tablet, Take 1 tablet by mouth 2 (Two) Times a Day for 7 days., Disp: 14 tablet, Rfl: 0    ferrous sulfate 325 (65 FE) MG EC tablet, Take 1 tablet by mouth Daily With Breakfast. (Patient not taking: Reported on 5/29/2025), Disp: 90 tablet, Rfl: 0    fluticasone (FLONASE) 50 MCG/ACT nasal spray, Administer 2 sprays into the nostril(s) as directed by provider Daily. 2 sprays each nostril daily (Patient not taking: Reported on 5/29/2025), Disp: 16 g, Rfl: 1    vitamin C (ASCORBIC ACID) 500 MG tablet, Take 1 tablet by mouth Daily. (Patient not taking: Reported on 5/29/2025), Disp: 90 tablet, Rfl: 0    vitamin D (ERGOCALCIFEROL) 1.25 MG (61938 UT) capsule capsule, Take 1 capsule by mouth 1  "(One) Time Per Week. (Patient not taking: Reported on 2025), Disp: 13 capsule, Rfl: 0     No Known Allergies    Social History     Tobacco Use    Smoking status: Former     Current packs/day: 0.00     Average packs/day: 0.3 packs/day for 12.0 years (3.0 ttl pk-yrs)     Types: Cigarettes     Start date: 2008     Quit date: 2020     Years since quittin.4     Passive exposure: Past    Smokeless tobacco: Never   Vaping Use    Vaping status: Never Used   Substance Use Topics    Alcohol use: Yes     Alcohol/week: 3.0 standard drinks of alcohol     Types: 3 Cans of beer per week    Drug use: Never        Objective     Vital Signs:   Vitals:    25 1626   BP: 149/85   Pulse: 71   Temp: 98 °F (36.7 °C)   Weight: 61.7 kg (136 lb)   Height: 157.5 cm (62\")       Tobacco Use: Medium Risk (2025)    Patient History     Smoking Tobacco Use: Former     Smokeless Tobacco Use: Never     Passive Exposure: Past         Physical Exam    Constitutional   Appearance  well developed, well-nourished, alert and in no acute distress, voice clear and strong    Head   Inspection  no deformities or lesions      Face   Inspection  no facial lesions; House-Brackmann I/VI bilaterally  Patient's right facial nerve branches are slightly weak but she is able to close her upper eyelid on the right.  CLINICAL PHOTOGRAPHS OTHER (2025)   CLINICAL PHOTOGRAPHS OTHER (2025)   CLINICAL PHOTOGRAPHS OTHER (2025)   CLINICAL PHOTOGRAPHS OTHER (2025)   CLINICAL PHOTOGRAPHS OTHER (2025)    Palpation  no TMJ crepitus nor  muscle tenderness bilaterally     Eyes/Vision   Visual Fields  extraocular movements are intact, no spontaneous or gaze-induced nystagmus  Conjunctivae  clear   Sclerae  clear   Pupils and Irises  pupils equal, round, and reactive to light.   Nystagmus  not present     Ears, Nose, Mouth and Throat  Ears  External Ears  appearance within normal limits, no lesions present   Otoscopic " Examination  tympanic membrane appearance within normal limits bilaterally without perforations, well-aerated middle ears   Hearing  intact to conversational voice both ears   Tunning fork testing    Rinne:  Irving:    Nose  External Nose  appearance normal   Intranasal Exam  mucosa within normal limits, vestibules normal, no intranasal lesions present, septum midline, sinuses non tender to percussion   Modified Woods Test:    Oral Cavity  Oral Mucosa  oral mucosa normal without pallor or cyanosis   Lips  lip appearance normal   Teeth  normal dentition for age   Gums  gums pink, non-swollen, no bleeding present   Tongue  tongue appearance normal; normal mobility   Palate  hard palate normal, soft palate appearance normal with symmetric mobility     Throat  Oropharynx  no inflammation or lesions present, tonsils within normal limits   Hypopharynx  appearance within normal limits   Larynx  voice normal     Neck  Inspection/Palpation  normal appearance, no masses or tenderness, trachea midline; thyroid size normal, nontender, no nodules or masses present on palpation   Right parotidectomy incision looks good and the drain already removed and healing nicely.  Incision sutures were removed uneventfully.    Lymphatic  Neck  no lymphadenopathy present   Supraclavicular Nodes  no lymphadenopathy present   Preauricular Nodes  no lymphadenopathy present     Respiratory  Respiratory Effort  breathing unlabored   Inspection of Chest  normal appearance, no retractions     Musculoskeletal   Cervical back: Normal range of motion and neck supple.      Skin and Subcutaneous Tissue  General Inspection  Regarding face and neck - there are no rashes present, no lesions present, and no areas of discoloration     Neurologic  Cranial Nerves  cranial nerves II-XII are grossly intact bilaterally   Gait and Station  normal gait, able to stand without diffculty    Psychiatric  Judgement and Insight  judgment and insight intact   Mood and  Affect  mood normal, affect appropriate       RESULTS REVIEWED    I have reviewed the following information:   [x]  Previous Internal Note  []  Previous External Note:   [x]  Ordered Tests & Results:      Pathology:   Lab Results   Component Value Date    Microscopic Description  05/20/2025     Microscopic examination performed.         TSH   Date Value Ref Range Status   05/13/2025 2.210 0.270 - 4.200 uIU/mL Final     Free T4   Date Value Ref Range Status   05/13/2025 0.98 0.92 - 1.68 ng/dL Final     PTH, Intact   Date Value Ref Range Status   06/29/2023 26.0 15.0 - 65.0 pg/mL Final     Calcium   Date Value Ref Range Status   02/07/2025 9.9 8.6 - 10.5 mg/dL Final   06/03/2020 8.9 8.4 - 10.2 mg/dL Final     25 Hydroxy, Vitamin D   Date Value Ref Range Status   02/07/2025 23.1 (L) 30.0 - 100.0 ng/ml Final       US Guided Salivary Gland Biopsy  Result Date: 3/21/2025  Impression: Technically successful fine-needle aspiration of a 1.8 cm right parotid lobe lesion. The lesion appears largely cystic or necrotic which may make pathologic diagnosis difficult. Electronically Signed: Arik Fam MD  3/21/2025 5:09 PM EDT  Workstation ID: [id number]    US Head Neck Soft Tissue  Result Date: 2/20/2025  Impression: In the region of the right parotid, there is a 1.7 cm heterogeneous part cystic mass with internal vascularity. This could be a primary parotid lesion. Recommend ultrasound-guided biopsy to further evaluate. Electronically Signed: Manjeet Cota MD  2/20/2025 4:08 PM EST  Workstation ID: [id number]      I have discussed the interpretation of the above results with the patient.    Procedures          Assessment and Plan   Diagnoses and all orders for this visit:    1. 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 Her smoking use included  cigarettes. She started smoking about 17 years ago. She has a 3 pack-year smoking history. She has been exposed to tobacco smoke. She has never used smokeless tobacco.         Plan:  Patient Instructions   1.  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  However final diagnosis and pathology is still pending second opinion from [facility].  2.  Patient has very mild right facial weakness which she had already been treated with Medrol Dosepak.  However at this point I think it is probably nerve injury from dissection.  I recommend patient to do biofeedback with exercises.  3.  Patient has follow-up appointment on June 26 and at that time we will reassess the facial function but also to go over the pathology report if I was able to get the results sooner I will try to contact the patient.        Follow Up   Return Follow-up as scheduled on 6/26/2025.  Patient was given instructions and counseling regarding her condition or for health maintenance advice. Please see specific information pulled into the AVS if appropriate.

## 2025-05-30 LAB
CYTO UR: NORMAL
LAB AP CASE REPORT: NORMAL
LAB AP CLINICAL INFORMATION: NORMAL
LAB AP SYNOPTIC CHECKLIST: NORMAL
Lab: NORMAL
PATH REPORT.FINAL DX SPEC: NORMAL
PATH REPORT.GROSS SPEC: NORMAL

## 2025-06-03 ENCOUNTER — TELEPHONE (OUTPATIENT)
Dept: OTOLARYNGOLOGY | Facility: CLINIC | Age: 35
End: 2025-06-03

## 2025-06-03 NOTE — TELEPHONE ENCOUNTER
Hub staff attempted to follow warm transfer process and was unsuccessful     Caller: ROSALEE HARP    Relationship to patient: SELF    Best call back number: [phone]    Patient is needing: PT CALLING TO TRY AND GET POST OP APPT SCHEDULE FOR 6/26/25 SCHEDULED SOONER. PT IS HAVING SOME CONCERNS REGARDING INFORMATION OF RESULTS SHE SAW ON MYCHART. PLEASE CALL PT BACK TO DISCUSS AND RESCHEDULE IF NEEDED THANK YOU.

## 2025-06-05 ENCOUNTER — OFFICE VISIT (OUTPATIENT)
Dept: OTOLARYNGOLOGY | Facility: CLINIC | Age: 35
End: 2025-06-05
Payer: COMMERCIAL

## 2025-06-05 VITALS
HEART RATE: 72 BPM | TEMPERATURE: 97.7 F | BODY MASS INDEX: 25.03 KG/M2 | HEIGHT: 62 IN | WEIGHT: 136 LBS | SYSTOLIC BLOOD PRESSURE: 115 MMHG | DIASTOLIC BLOOD PRESSURE: 75 MMHG

## 2025-06-05 DIAGNOSIS — C07 MUCOEPIDERMOID CARCINOMA OF PAROTID GLAND: Primary | ICD-10-CM

## 2025-06-05 DIAGNOSIS — G51.0 FACIAL PARALYSIS ON RIGHT SIDE: ICD-10-CM

## 2025-06-05 DIAGNOSIS — K11.8 MASS OF RIGHT PAROTID GLAND: ICD-10-CM

## 2025-06-05 DIAGNOSIS — H02.203: ICD-10-CM

## 2025-06-05 PROCEDURE — 99024 POSTOP FOLLOW-UP VISIT: CPT | Performed by: OTOLARYNGOLOGY

## 2025-06-05 NOTE — PROGRESS NOTES
Patient Name: Laura Hinton   Visit Date: 06/05/2025   Patient ID: [id number]  Provider: Boubacar Johnson MD    Sex: female  [place] [facility]   YOB: 1990  Location Address: [address] [place] [address], [address] [place] [address],?[place]?[id number]    Primary Care Provider Elmo Castro, APRN  Location Phone: [phone]    Referring Provider: No ref. provider found        Chief Complaint  Post-op Follow-up, biopsy results, parotid mass right side, and facial paralysis right side    History of Present Illness  Laura Hinton is a 34 y.o. female who presents to [facility] for Post-op Follow-up, biopsy results, parotid mass right side, and facial paralysis right side.    Patient was followed by Ms. Elmo Castro for routine annual physical examination on 2/7/2025 and noted a right preauricular mass noted to be parotid mass which she further worked up with ultrasound of head and neck Showing 1.7 cm heterogeneous partially cystic mass with internal vascularity recommending biopsy.  On 3/21/2025 patient underwent ultrasound-guided biopsy of the right parotid mass.  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  Patient was then sent to Dr. Thompson for evaluation and was subsequently referred to ENT for further evaluation and surgical management.   Patient underwent following procedure on 5/20/2025.    1.  RIGHT PAROTIDECTOMY WITH FACIAL NERVE PRESERVATION AND FROZEN SECTION   2.  RECONSTRUCTION WITH PEDICLED PREPAROTID FASCIAL FLAP  3.  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  - See synoptic checklist below     Comment:  The above  diagnosis was rendered in expert consultation by Brent Rick MD, PhD , [facility]  . See separate consultation report for additional information.        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  Removing abnormal cells on the cervix;  Surgeon: Angella Ryan DO;  Location: [facility];  Service: Gynecology;  Laterality: N/A;    PAROTIDECTOMY Right 5/20/2025    Procedure: RIGHT PAROTIDECTOMY WITH FACIAL NERVE PRESERVATION, FROZEN SECTION, RECONSTRUCTION WITH PAROTID FASCIAL FLAP, MONITORING OF RIGHT FACIAL NERVE;  Surgeon: Boubacar Johnson MD;  Location: [facility] OR;  Service: ENT;  Laterality: Right;    TOTAL HIP ARTHROPLASTY Bilateral     TOTAL SHOULDER REPLACEMENT Right          Current Outpatient Medications:     artificial tears (LUBRIFRESH P.M.) ointment ophthalmic ointment, Administer 1 Application to the right eye Every 1 (One) Hour As Needed (for eye dryness as needed)., Disp: 3.5 g, Rfl: 2    HYDROcodone-acetaminophen (NORCO) 7.5-325 MG per tablet, Take 1-2 tablets by mouth Every 6 (Six) Hours As Needed for Moderate Pain (Pain)., Disp: 20 tablet, Rfl: 0    loratadine (CLARITIN) 10 MG tablet, Take 1 tablet by mouth Daily., Disp: 90 tablet, Rfl: 1    azithromycin (ZITHROMAX) 250 MG tablet, Take 2 tablets by mouth the first day, then 1 tablet daily for 4 days. (Patient not taking: Reported on 6/5/2025), Disp: 6 tablet, Rfl: 0    ferrous sulfate 325 (65 FE) MG EC tablet, Take 1 tablet by mouth Daily With Breakfast. (Patient not taking: Reported on 5/23/2025), Disp: 90 tablet, Rfl: 0    fluticasone (FLONASE) 50 MCG/ACT nasal spray, Administer 2 sprays into the  "nostril(s) as directed by provider Daily. 2 sprays each nostril daily (Patient not taking: Reported on 2025), Disp: 16 g, Rfl: 1    vitamin C (ASCORBIC ACID) 500 MG tablet, Take 1 tablet by mouth Daily. (Patient not taking: Reported on 2025), Disp: 90 tablet, Rfl: 0    vitamin D (ERGOCALCIFEROL) 1.25 MG (97627 UT) capsule capsule, Take 1 capsule by mouth 1 (One) Time Per Week. (Patient not taking: Reported on 2025), Disp: 13 capsule, Rfl: 0     No Known Allergies    Social History     Tobacco Use    Smoking status: Former     Current packs/day: 0.00     Average packs/day: 0.3 packs/day for 12.0 years (3.0 ttl pk-yrs)     Types: Cigarettes     Start date: 2008     Quit date: 2020     Years since quittin.4     Passive exposure: Past    Smokeless tobacco: Never   Vaping Use    Vaping status: Never Used   Substance Use Topics    Alcohol use: Yes     Alcohol/week: 3.0 standard drinks of alcohol     Types: 3 Cans of beer per week    Drug use: Never        Objective     Vital Signs:   Vitals:    25 0940   BP: 115/75   Pulse: 72   Temp: 97.7 °F (36.5 °C)   Weight: 61.7 kg (136 lb)   Height: 157.5 cm (62\")       Tobacco Use: Medium Risk (2025)    Patient History     Smoking Tobacco Use: Former     Smokeless Tobacco Use: Never     Passive Exposure: Past         Physical Exam    Constitutional   Appearance  well developed, well-nourished, alert and in no acute distress, voice clear and strong    Head   Inspection  no deformities or lesions      Face   Inspection  no facial lesions; House-Brackmann II/VI right and I/VI left  right facial weakness has improved and now patient is able to close her right eyelid and smile with the oral commissure rising up.   Palpation  no TMJ crepitus nor  muscle tenderness bilaterally     Eyes/Vision   Visual Fields  extraocular movements are intact, no spontaneous or gaze-induced nystagmus  Conjunctivae  clear   Sclerae  clear   Pupils and " Irises  pupils equal, round, and reactive to light.   Nystagmus  not present     Ears, Nose, Mouth and Throat  Ears  External Ears  appearance within normal limits, no lesions present   Otoscopic Examination  tympanic membrane appearance within normal limits bilaterally without perforations, well-aerated middle ears   Hearing  intact to conversational voice both ears   Tunning fork testing    Rinne:  Irving:    Nose  External Nose  appearance normal   Intranasal Exam  mucosa within normal limits, vestibules normal, no intranasal lesions present, septum midline, sinuses non tender to percussion   Modified Lyon Test:    Oral Cavity  Oral Mucosa  oral mucosa normal without pallor or cyanosis   Lips  lip appearance normal   Teeth  normal dentition for age   Gums  gums pink, non-swollen, no bleeding present   Tongue  tongue appearance normal; normal mobility   Palate  hard palate normal, soft palate appearance normal with symmetric mobility     Throat  Oropharynx  no inflammation or lesions present, tonsils within normal limits   Hypopharynx  appearance within normal limits   Larynx  voice normal     Neck  Inspection/Palpation  normal appearance, no masses or tenderness, trachea midline; thyroid size normal, nontender, no nodules or masses present on palpation     Lymphatic  Neck  no lymphadenopathy present   Supraclavicular Nodes  no lymphadenopathy present   Preauricular Nodes  no lymphadenopathy present     Respiratory  Respiratory Effort  breathing unlabored   Inspection of Chest  normal appearance, no retractions     Musculoskeletal   Cervical back: Normal range of motion and neck supple.      Skin and Subcutaneous Tissue  General Inspection  Regarding face and neck - there are no rashes present, no lesions present, and no areas of discoloration     Neurologic  Cranial Nerves  cranial nerves II-XII are grossly intact bilaterally   Gait and Station  normal gait, able to stand without  diffculty    Psychiatric  Judgement and Insight  judgment and insight intact   Mood and Affect  mood normal, affect appropriate       RESULTS REVIEWED    I have reviewed the following information:   [x]  Previous Internal Note  []  Previous External Note:   [x]  Ordered Tests & Results:      Pathology:   Lab Results   Component Value Date    Microscopic Description  05/20/2025     Microscopic examination performed.         TSH   Date Value Ref Range Status   05/13/2025 2.210 0.270 - 4.200 uIU/mL Final     Free T4   Date Value Ref Range Status   05/13/2025 0.98 0.92 - 1.68 ng/dL Final     PTH, Intact   Date Value Ref Range Status   06/29/2023 26.0 15.0 - 65.0 pg/mL Final     Calcium   Date Value Ref Range Status   02/07/2025 9.9 8.6 - 10.5 mg/dL Final   06/03/2020 8.9 8.4 - 10.2 mg/dL Final     25 Hydroxy, Vitamin D   Date Value Ref Range Status   02/07/2025 23.1 (L) 30.0 - 100.0 ng/ml Final       US Guided Salivary Gland Biopsy  Result Date: 3/21/2025  Impression: Technically successful fine-needle aspiration of a 1.8 cm right parotid lobe lesion. The lesion appears largely cystic or necrotic which may make pathologic diagnosis difficult. Electronically Signed: Arik Fam MD  3/21/2025 5:09 PM EDT  Workstation ID: [id number]    US Head Neck Soft Tissue  Result Date: 2/20/2025  Impression: In the region of the right parotid, there is a 1.7 cm heterogeneous part cystic mass with internal vascularity. This could be a primary parotid lesion. Recommend ultrasound-guided biopsy to further evaluate. Electronically Signed: Manjeet Cota MD  2/20/2025 4:08 PM EST  Workstation ID: [id number]      I have discussed the interpretation of the above results with the patient.    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 Mass of right parotid  gland  -     Ambulatory Referral to Radiation Oncology    4. 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 Her smoking use included cigarettes. She started smoking about 17 years ago. She has a 3 pack-year smoking history. She has been exposed to tobacco smoke. She has never used smokeless tobacco.         Plan:  Patient Instructions   1.  Patient had right superficial parotidectomy with facial nerve preservation.  Patient is here for follow-up and final diagnosis which is a low-grade mucoepidermoid carcinoma.  2.  I have explained to the patient about the nature of the mucoepidermoid carcinoma and its behavior being very slow-growing due to low-grade.  3.  As for the options for further treatments, I did not recommend proceeding with total parotidectomy at this point as there is a high risk of facial nerve injury.  But rather I would recommend proceeding with radiation therapy as an alternative option.  4.  I will arrange for radiation oncology appointment for patient.  5.  I will see patient in 3 months for reexamination.  In the meantime I recommended continuation of facial muscle exercise with biofeedback.        Follow Up   Return in about 3 months (around 9/5/2025), or Follow-up 3 months.  Patient was given instructions and counseling regarding her condition or for health maintenance advice. Please see specific information pulled into the AVS if appropriate.

## 2025-06-05 NOTE — PATIENT INSTRUCTIONS
1.  Patient had right superficial parotidectomy with facial nerve preservation.  Patient is here for follow-up and final diagnosis which is a low-grade mucoepidermoid carcinoma.  2.  I have explained to the patient about the nature of the mucoepidermoid carcinoma and its behavior being very slow-growing due to low-grade.  3.  As for the options for further treatments, I did not recommend proceeding with total parotidectomy at this point as there is a high risk of facial nerve injury.  But rather I would recommend proceeding with radiation therapy as an alternative option.  4.  I will arrange for radiation oncology appointment for patient.  5.  I will see patient in 3 months for reexamination.  In the meantime I recommended continuation of facial muscle exercise with biofeedback.

## 2025-06-12 ENCOUNTER — CONSULT (OUTPATIENT)
Dept: RADIATION ONCOLOGY | Facility: HOSPITAL | Age: 35
End: 2025-06-12
Payer: COMMERCIAL

## 2025-06-12 VITALS
OXYGEN SATURATION: 100 % | RESPIRATION RATE: 16 BRPM | BODY MASS INDEX: 24.76 KG/M2 | DIASTOLIC BLOOD PRESSURE: 80 MMHG | HEART RATE: 69 BPM | TEMPERATURE: 97 F | SYSTOLIC BLOOD PRESSURE: 137 MMHG | WEIGHT: 135.36 LBS

## 2025-06-12 DIAGNOSIS — C07 CANCER OF PAROTID GLAND: ICD-10-CM

## 2025-06-12 DIAGNOSIS — C07 MUCOEPIDERMOID CARCINOMA OF PAROTID GLAND: Primary | ICD-10-CM

## 2025-06-12 PROCEDURE — G0463 HOSPITAL OUTPT CLINIC VISIT: HCPCS | Performed by: RADIOLOGY

## 2025-06-12 NOTE — LETTER
June 12, 2025     Boubacar Johnson MD  [address] [place] [id number]    Patient: Laura Hinton   YOB: 1990   Date of Visit: 6/12/2025     Dear Boubacar Johnson MD:       Thank you for referring Laura Hinton to me for evaluation. Below are the relevant portions of my assessment and plan of care.    If you have questions, please do not hesitate to call me.         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  Ms. Hinton reports being diagnosed with stage IV Hodgkin's lymphoma in 2009 in the [facility].  She was ultimately treated with chemotherapy and and radiation and experienced remission but recurred in 2013 at which time she underwent stem cell transplant at [facility].  At the time of initial chemotherapy followed by radiotherapy in 2009, she reports radiotherapy to the left neck but cannot recall if she received radiotherapy to the right neck.  She reports subsequent remission.  She began experiencing enlargement of a right parotid nodule in early 2025.  She underwent ultrasound on 2/18/2025 revealing a 1.7 x 1.6 x 1.4 cm mass within the right parotid gland.  The mass demonstrated mild vascularity.  She did not undergo CT scan of the  soft tissue neck at that time.  She was evaluated by Dr. Johnson and underwent FNA on 3/21/2025 with pathology revealing subtle morphologic features consistent with Warthin tumor.  On 5/20/2025, she underwent right superficial parotidectomy revealing low-grade mucoepidermoid carcinoma measuring at least 1.1 cm extending to at least the inked soft tissue margin posteriorly and laterally.  Ms. Hinton reports feeling well overall after undergoing surgery but is experiencing right facial weakness.    Subjective      Review of Systems: Review of Systems   Constitutional:  Positive for fatigue (5/10). Negative for appetite change, chills, fever and unexpected weight change.   HENT:  Negative for ear pain, facial swelling, hearing loss, nosebleeds, rhinorrhea, sinus pressure, sinus pain, sore throat, trouble swallowing and voice change.    Eyes:  Negative for pain, discharge, redness, itching and visual disturbance (States that she had blurred vision post op but has since subsided.).        Right eyelid does not completely close.   Respiratory:  Negative for cough, chest tightness, shortness of breath and wheezing.    Cardiovascular:  Negative for chest pain and palpitations.   Gastrointestinal:  Negative for abdominal pain, constipation, diarrhea, nausea and vomiting.   Genitourinary:  Negative for difficulty urinating, dysuria, frequency, hematuria, pelvic pain, urgency, vaginal bleeding, vaginal discharge and vaginal pain.   Musculoskeletal:  Negative for arthralgias and back pain.   Skin:  Negative for color change and rash.   Neurological:  Positive for facial asymmetry (Slight right sided facial drooping.). Negative for dizziness, weakness and headaches.   Psychiatric/Behavioral:  Negative for self-injury, sleep disturbance and suicidal ideas.        Past Medical History:   Past Medical History:   Diagnosis Date   • Abnormal Pap smear of cervix 02/10/25   • Anemia    • Anxiety    • History of blood transfusion    • HPV  (human papilloma virus) infection 02/10/25   • Hyperlipidemia 02/10/2025   • Lymphoma in remission        Past Surgical History:   Past Surgical History:   Procedure Laterality Date   • LEEP N/A 2025    Procedure: LOOP ELECTROCAUTERY EXCISION PROCEDURE.  Removing abnormal cells on the cervix;  Surgeon: Angella Ryan DO;  [facility];  Service: Gynecology;  Laterality: N/A;   • PAROTIDECTOMY Right 2025    Procedure: RIGHT PAROTIDECTOMY WITH FACIAL NERVE PRESERVATION, FROZEN SECTION, RECONSTRUCTION WITH PAROTID FASCIAL FLAP, MONITORING OF RIGHT FACIAL NERVE;  Surgeon: Boubacar Johnson MD;  Location: [facility] OR;  Service: ENT;  Laterality: Right;   • TOTAL HIP ARTHROPLASTY Bilateral    • TOTAL SHOULDER REPLACEMENT Right        Family History:   Family History   Problem Relation Age of Onset   • Breast cancer Maternal Aunt    • Breast cancer Niece    • Uterine cancer Neg Hx    • Ovarian cancer Neg Hx    • Colon cancer Neg Hx    • Pulmonary embolism Neg Hx    • Deep vein thrombosis Neg Hx        Social History:   Social History     Socioeconomic History   • Marital status:    Tobacco Use   • Smoking status: Former     Current packs/day: 0.00     Average packs/day: 0.3 packs/day for 2.0 years (0.5 ttl pk-yrs)     Types: Cigarettes     Start date:      Quit date:      Years since quittin.4     Passive exposure: Past   • Smokeless tobacco: Never   Vaping Use   • Vaping status: Former   • Substances: Nicotine, Flavoring   Substance and Sexual Activity   • Alcohol use: Yes     Alcohol/week: 3.0 standard drinks of alcohol     Types: 3 Cans of beer per week     Comment: Occasionally, maybe once/week.   • Drug use: Never   • Sexual activity: Defer       Medications:     Current Outpatient Medications:   •  artificial tears (LUBRIFRESH P.M.) ointment ophthalmic ointment, Administer 1 Application to the right eye Every 1 (One) Hour As Needed (for eye dryness as needed)., Disp: 3.5 g, Rfl:  2  •  ferrous sulfate 325 (65 FE) MG EC tablet, Take 1 tablet by mouth Daily With Breakfast., Disp: 90 tablet, Rfl: 0  •  fluticasone (FLONASE) 50 MCG/ACT nasal spray, Administer 2 sprays into the nostril(s) as directed by provider Daily. 2 sprays each nostril daily, Disp: 16 g, Rfl: 1  •  loratadine (CLARITIN) 10 MG tablet, Take 1 tablet by mouth Daily., Disp: 90 tablet, Rfl: 1  •  vitamin C (ASCORBIC ACID) 500 MG tablet, Take 1 tablet by mouth Daily., Disp: 90 tablet, Rfl: 0  •  vitamin D (ERGOCALCIFEROL) 1.25 MG (45682 UT) capsule capsule, Take 1 capsule by mouth 1 (One) Time Per Week., Disp: 13 capsule, Rfl: 0    Allergies:   No Known Allergies    Pain: (on a scale of 0-10)   Pain Score    06/12/25 0836   PainSc: 0-No pain       Laura Hinton reports a pain score of 0.  Given her pain assessment as noted, treatment options were discussed and the following options were decided upon as a follow-up plan to address the patient's pain: continuation of current treatment plan for pain.     Advanced Care Plan: N   Quality of Life: 100 - Full Activity    Objective     Physical Exam:   Vital Signs:   Vitals:    06/12/25 0836   BP: 137/80   Pulse: 69   Resp: 16   Temp: 97 °F (36.1 °C)   TempSrc: Temporal   SpO2: 100%   Weight: 61.4 kg (135 lb 5.8 oz)   PainSc: 0-No pain     Body mass index is 24.76 kg/m².     Physical Exam  Constitutional:       General: She is not in acute distress.     Appearance: Normal appearance. She is normal weight.   HENT:      Head: Normocephalic and atraumatic.   Neck:      Comments: Well-healed curvilinear incision at right inferior post-auricular region; horizontal scar at right posterior neck consistent with history of incisional biopsy  Pulmonary:      Effort: Pulmonary effort is normal. No respiratory distress.   Musculoskeletal:      Cervical back: Neck supple. No rigidity or tenderness.   Lymphadenopathy:      Cervical: No cervical adenopathy.   Skin:     General: Skin is warm and dry.       Coloration: Skin is not jaundiced.   Neurological:      Mental Status: She is alert and oriented to person, place, and time.      Cranial Nerves: Cranial nerve deficit present.      Comments: Right side House-Brackmann grade 4   Psychiatric:         Mood and Affect: Mood normal.         Behavior: Behavior normal.         Judgment: Judgment normal.         Results:   Radiographs: I personally reviewed the ultrasound performed on 2/18/2025.  The pertinent findings are as above in HPI.      Pathology: I personally reviewed the pathology reports from the procedures performed on 3/21/2025 and 5/20/2025.  The pertinent findings are as above in HPI.    Labs:   WBC   Date Value Ref Range Status   04/16/2025 6.86 3.40 - 10.80 10*3/mm3 Final   05/29/2020 6.54 4.5 - 11.0 10*3/uL Final     Hemoglobin   Date Value Ref Range Status   04/16/2025 11.8 (L) 12.0 - 15.9 g/dL Final   06/03/2020 10.2 (L) 12.0 - 16.0 g/dL Final     Hematocrit   Date Value Ref Range Status   04/16/2025 35.3 34.0 - 46.6 % Final   06/03/2020 30.9 (L) 36.0 - 46.0 % Final     Platelets   Date Value Ref Range Status   04/16/2025 315 140 - 450 10*3/mm3 Final   05/29/2020 346 140 - 440 10*3/uL Final       Assessment / Plan      Assessment/Plan:   Laura Hinton is a 34-year-old female with a history of reported stage IV Hodgkin's lymphoma initially diagnosed and treated in 2009 with chemotherapy and radiotherapy with recurrence in 2013 with stem cell transplant at that time.  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  A positive margin was appreciated on pathology.  ECOG 0    I discussed the clinical, radiographic and pathologic findings to date with Mrs. Hinton.  I explained the role of radiotherapy in the management of low-grade mucoepidermoid carcinoma of the parotid gland.  I explained that from the perspective of Dr. Johnson there is a concern for the accuracy of the pathologic  interpretation of a positive margin in that the clinically appreciated location of this positive margin may be at the gland capsule edge and not within parotid gland parenchyma.  I additionally explained that in the setting of a positive margin for mucoepidermoid carcinoma of all grades, adjuvant radiotherapy would be warranted if additional resection cannot be performed.  It is my recommendation that she undergo adjuvant external beam radiotherapy to the right parotid bed.  She may have received external beam radiotherapy to the right neck in which case the appreciated mucoepidermoid carcinoma of the parotid gland may represent a radiation-induced malignancy.  I would like her to undergo evaluation by Dr. Jose David Albert at [facility] as a second opinion.  Mrs. Hinton will return in 2 weeks for repeat evaluation and potential CT simulation at that time.  Additionally, she will undergo CT scan of the soft tissue neck as well as CT scan of the chest for staging.        Zeb Lopez MD  Radiation Oncology  [facility]    This document has been signed by Zeb Lopez MD on June 12, 2025 13:51 EDT

## 2025-06-12 NOTE — PROGRESS NOTES
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  Ms. Hinton reports being diagnosed with stage IV Hodgkin's lymphoma in 2009 in the [facility].  She was ultimately treated with chemotherapy and and radiation and experienced remission but recurred in 2013 at which time she underwent stem cell transplant at [facility].  At the time of initial chemotherapy followed by radiotherapy in 2009, she reports radiotherapy to the left neck but cannot recall if she received radiotherapy to the right neck.  She reports subsequent remission.  She began experiencing enlargement of a right parotid nodule in early 2025.  She underwent ultrasound on 2/18/2025 revealing a 1.7 x 1.6 x 1.4 cm mass within the right parotid gland.  The mass demonstrated mild vascularity.  She did not undergo CT scan of the soft tissue neck at that time.  She was evaluated by Dr. Johnson and underwent FNA on 3/21/2025 with pathology revealing subtle morphologic features consistent with Warthin tumor.  On 5/20/2025, she underwent right superficial parotidectomy revealing low-grade mucoepidermoid carcinoma measuring at least 1.1 cm extending to at least the inked soft tissue margin posteriorly and laterally.  Ms. Hinton reports feeling well overall after undergoing surgery but is experiencing right facial weakness.    Subjective      Review of Systems: Review of  Systems   Constitutional:  Positive for fatigue (5/10). Negative for appetite change, chills, fever and unexpected weight change.   HENT:  Negative for ear pain, facial swelling, hearing loss, nosebleeds, rhinorrhea, sinus pressure, sinus pain, sore throat, trouble swallowing and voice change.    Eyes:  Negative for pain, discharge, redness, itching and visual disturbance (States that she had blurred vision post op but has since subsided.).        Right eyelid does not completely close.   Respiratory:  Negative for cough, chest tightness, shortness of breath and wheezing.    Cardiovascular:  Negative for chest pain and palpitations.   Gastrointestinal:  Negative for abdominal pain, constipation, diarrhea, nausea and vomiting.   Genitourinary:  Negative for difficulty urinating, dysuria, frequency, hematuria, pelvic pain, urgency, vaginal bleeding, vaginal discharge and vaginal pain.   Musculoskeletal:  Negative for arthralgias and back pain.   Skin:  Negative for color change and rash.   Neurological:  Positive for facial asymmetry (Slight right sided facial drooping.). Negative for dizziness, weakness and headaches.   Psychiatric/Behavioral:  Negative for self-injury, sleep disturbance and suicidal ideas.        Past Medical History:   Past Medical History:   Diagnosis Date    Abnormal Pap smear of cervix 02/10/25    Anemia     Anxiety     History of blood transfusion     HPV (human papilloma virus) infection 02/10/25    Hyperlipidemia 02/10/2025    Lymphoma in remission        Past Surgical History:   Past Surgical History:   Procedure Laterality Date    LEEP N/A 4/16/2025    Procedure: LOOP ELECTROCAUTERY EXCISION PROCEDURE.  Removing abnormal cells on the cervix;  Surgeon: Angella Ryan DO;  [facility]: [facility] [place];  Service: Gynecology;  Laterality: N/A;    PAROTIDECTOMY Right 5/20/2025    Procedure: RIGHT PAROTIDECTOMY WITH FACIAL NERVE PRESERVATION, FROZEN SECTION, RECONSTRUCTION WITH PAROTID FASCIAL FLAP,  MONITORING OF RIGHT FACIAL NERVE;  Surgeon: Boubacar Johnson MD;  Location: [facility] MAIN OR;  Service: ENT;  Laterality: Right;    TOTAL HIP ARTHROPLASTY Bilateral     TOTAL SHOULDER REPLACEMENT Right        Family History:   Family History   Problem Relation Age of Onset    Breast cancer Maternal Aunt     Breast cancer Niece     Uterine cancer Neg Hx     Ovarian cancer Neg Hx     Colon cancer Neg Hx     Pulmonary embolism Neg Hx     Deep vein thrombosis Neg Hx        Social History:   Social History     Socioeconomic History    Marital status:    Tobacco Use    Smoking status: Former     Current packs/day: 0.00     Average packs/day: 0.3 packs/day for 2.0 years (0.5 ttl pk-yrs)     Types: Cigarettes     Start date:      Quit date:      Years since quittin.4     Passive exposure: Past    Smokeless tobacco: Never   Vaping Use    Vaping status: Former    Substances: Nicotine, Flavoring   Substance and Sexual Activity    Alcohol use: Yes     Alcohol/week: 3.0 standard drinks of alcohol     Types: 3 Cans of beer per week     Comment: Occasionally, maybe once/week.    Drug use: Never    Sexual activity: Defer       Medications:     Current Outpatient Medications:     artificial tears (LUBRIFRESH P.M.) ointment ophthalmic ointment, Administer 1 Application to the right eye Every 1 (One) Hour As Needed (for eye dryness as needed)., Disp: 3.5 g, Rfl: 2    ferrous sulfate 325 (65 FE) MG EC tablet, Take 1 tablet by mouth Daily With Breakfast., Disp: 90 tablet, Rfl: 0    fluticasone (FLONASE) 50 MCG/ACT nasal spray, Administer 2 sprays into the nostril(s) as directed by provider Daily. 2 sprays each nostril daily, Disp: 16 g, Rfl: 1    loratadine (CLARITIN) 10 MG tablet, Take 1 tablet by mouth Daily., Disp: 90 tablet, Rfl: 1    vitamin C (ASCORBIC ACID) 500 MG tablet, Take 1 tablet by mouth Daily., Disp: 90 tablet, Rfl: 0    vitamin D (ERGOCALCIFEROL) 1.25 MG (08646 UT) capsule capsule, Take 1 capsule by  mouth 1 (One) Time Per Week., Disp: 13 capsule, Rfl: 0    Allergies:   No Known Allergies    Pain: (on a scale of 0-10)   Pain Score    06/12/25 0836   PainSc: 0-No pain       Laura Hinton reports a pain score of 0.  Given her pain assessment as noted, treatment options were discussed and the following options were decided upon as a follow-up plan to address the patient's pain: continuation of current treatment plan for pain.     Advanced Care Plan: N   Quality of Life: 100 - Full Activity    Objective     Physical Exam:   Vital Signs:   Vitals:    06/12/25 0836   BP: 137/80   Pulse: 69   Resp: 16   Temp: 97 °F (36.1 °C)   TempSrc: Temporal   SpO2: 100%   Weight: 61.4 kg (135 lb 5.8 oz)   PainSc: 0-No pain     Body mass index is 24.76 kg/m².     Physical Exam  Constitutional:       General: She is not in acute distress.     Appearance: Normal appearance. She is normal weight.   HENT:      Head: Normocephalic and atraumatic.   Neck:      Comments: Well-healed curvilinear incision at right inferior post-auricular region; horizontal scar at right posterior neck consistent with history of incisional biopsy  Pulmonary:      Effort: Pulmonary effort is normal. No respiratory distress.   Musculoskeletal:      Cervical back: Neck supple. No rigidity or tenderness.   Lymphadenopathy:      Cervical: No cervical adenopathy.   Skin:     General: Skin is warm and dry.      Coloration: Skin is not jaundiced.   Neurological:      Mental Status: She is alert and oriented to person, place, and time.      Cranial Nerves: Cranial nerve deficit present.      Comments: Right side House-Brackmann grade 4   Psychiatric:         Mood and Affect: Mood normal.         Behavior: Behavior normal.         Judgment: Judgment normal.         Results:   Radiographs: I personally reviewed the ultrasound performed on 2/18/2025.  The pertinent findings are as above in HPI.      Pathology: I personally reviewed the pathology reports from the  procedures performed on 3/21/2025 and 5/20/2025.  The pertinent findings are as above in HPI.    Labs:   WBC   Date Value Ref Range Status   04/16/2025 6.86 3.40 - 10.80 10*3/mm3 Final   05/29/2020 6.54 4.5 - 11.0 10*3/uL Final     Hemoglobin   Date Value Ref Range Status   04/16/2025 11.8 (L) 12.0 - 15.9 g/dL Final   06/03/2020 10.2 (L) 12.0 - 16.0 g/dL Final     Hematocrit   Date Value Ref Range Status   04/16/2025 35.3 34.0 - 46.6 % Final   06/03/2020 30.9 (L) 36.0 - 46.0 % Final     Platelets   Date Value Ref Range Status   04/16/2025 315 140 - 450 10*3/mm3 Final   05/29/2020 346 140 - 440 10*3/uL Final       Assessment / Plan      Assessment/Plan:   Laura Hinton is a 34-year-old female with a history of reported stage IV Hodgkin's lymphoma initially diagnosed and treated in 2009 with chemotherapy and radiotherapy with recurrence in 2013 with stem cell transplant at that time.  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  A positive margin was appreciated on pathology.  ECOG 0    I discussed the clinical, radiographic and pathologic findings to date with Mrs. Hinton.  I explained the role of radiotherapy in the management of low-grade mucoepidermoid carcinoma of the parotid gland.  I explained that from the perspective of Dr. Johnson there is a concern for the accuracy of the pathologic interpretation of a positive margin in that the clinically appreciated location of this positive margin may be at the gland capsule edge and not within parotid gland parenchyma.  I additionally explained that in the setting of a positive margin for mucoepidermoid carcinoma of all grades, adjuvant radiotherapy would be warranted if additional resection cannot be performed.  It is my recommendation that she undergo adjuvant external beam radiotherapy to the right parotid bed.  She may have received external beam radiotherapy to the right neck in which case the  appreciated mucoepidermoid carcinoma of the parotid gland may represent a radiation-induced malignancy.  I would like her to undergo evaluation by Dr. Jose David Albert at [facility] as a second opinion.  Mrs. Hinton will return in 2 weeks for repeat evaluation and potential CT simulation at that time.  Additionally, she will undergo CT scan of the soft tissue neck as well as CT scan of the chest for staging.        Zeb Lopez MD  Radiation Oncology  [facility]    This document has been signed by Zeb Lopez MD on June 12, 2025 13:51 EDT

## 2025-07-07 ENCOUNTER — OFFICE VISIT (OUTPATIENT)
Dept: RADIATION ONCOLOGY | Facility: HOSPITAL | Age: 35
End: 2025-07-07
Payer: COMMERCIAL

## 2025-07-07 VITALS
DIASTOLIC BLOOD PRESSURE: 82 MMHG | RESPIRATION RATE: 16 BRPM | TEMPERATURE: 97.9 F | SYSTOLIC BLOOD PRESSURE: 144 MMHG | OXYGEN SATURATION: 99 % | WEIGHT: 136.4 LBS | BODY MASS INDEX: 24.95 KG/M2 | HEART RATE: 75 BPM

## 2025-07-07 DIAGNOSIS — C07 CANCER OF PAROTID GLAND: ICD-10-CM

## 2025-07-07 DIAGNOSIS — C07 MUCOEPIDERMOID CARCINOMA OF PAROTID GLAND: Primary | ICD-10-CM

## 2025-07-07 PROCEDURE — G0463 HOSPITAL OUTPT CLINIC VISIT: HCPCS | Performed by: RADIOLOGY

## 2025-07-07 NOTE — PROGRESS NOTES
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  She has not been evaluated by Dr. Albert as planned.  She denies any changes since last evaluated.    Subjective      Review of Systems: Review of Systems   Constitutional:  Negative for appetite change, fatigue and unexpected weight change.   HENT:  Positive for facial swelling (right side, improving). Negative for congestion, ear pain, hearing loss, mouth sores, sinus pressure, sinus pain, sore throat, tinnitus, trouble swallowing and voice change.    Eyes:  Negative for visual disturbance.   Respiratory:  Negative for cough, chest tightness and shortness of breath.    Cardiovascular:  Negative for chest pain and palpitations.   Gastrointestinal:  Negative for abdominal pain, constipation, diarrhea, nausea and vomiting.   Genitourinary:  Negative for difficulty urinating and dysuria.   Musculoskeletal:  Negative for back pain, neck pain and neck stiffness.   Skin:  Negative for color change, rash and wound.   Neurological:  Positive for facial asymmetry (right sided droop, improved). Negative for dizziness, speech difficulty, light-headedness and headaches.        Right eye sluggish, improving     Psychiatric/Behavioral:  Negative for sleep disturbance.        The following portions of the patient's history were reviewed and updated as appropriate: allergies, current medications, past family history, past medical history, past social history, past surgical history and problem list.    Medications:     Current Outpatient Medications:     artificial tears (LUBRIFRESH P.M.) ointment ophthalmic ointment, Administer 1 Application to the  right eye Every 1 (One) Hour As Needed (for eye dryness as needed)., Disp: 3.5 g, Rfl: 2    ferrous sulfate 325 (65 FE) MG EC tablet, Take 1 tablet by mouth Daily With Breakfast., Disp: 90 tablet, Rfl: 0    fluticasone (FLONASE) 50 MCG/ACT nasal spray, Administer 2 sprays into the nostril(s) as directed by provider Daily. 2 sprays each nostril daily, Disp: 16 g, Rfl: 1    loratadine (CLARITIN) 10 MG tablet, Take 1 tablet by mouth Daily., Disp: 90 tablet, Rfl: 1    vitamin C (ASCORBIC ACID) 500 MG tablet, Take 1 tablet by mouth Daily., Disp: 90 tablet, Rfl: 0    vitamin D (ERGOCALCIFEROL) 1.25 MG (60135 UT) capsule capsule, Take 1 capsule by mouth 1 (One) Time Per Week., Disp: 13 capsule, Rfl: 0    Allergies:   No Known Allergies    ECOG: (0) Fully active, able to carry on all predisease performance without restriction  Quality of Life: 100 - Full Activity     Objective     Physical Exam:   Vital Signs:   Vitals:    07/07/25 0736 07/07/25 0759   BP: 144/82    Pulse: 75    Resp: 16    Temp:  97.9 °F (36.6 °C)   TempSrc:  Oral   SpO2: 99%    Weight: 61.9 kg (136 lb 6.4 oz)    PainSc: 0-No pain      Body mass index is 24.95 kg/m².     Physical Exam  Constitutional:       General: She is not in acute distress.     Appearance: She is normal weight. She is not ill-appearing, toxic-appearing or diaphoretic.   HENT:      Head: Normocephalic and atraumatic.   Pulmonary:      Effort: Pulmonary effort is normal. No respiratory distress.   Skin:     General: Skin is warm and dry.      Coloration: Skin is not jaundiced.      Findings: No lesion.   Neurological:      Mental Status: She is alert and oriented to person, place, and time.      Gait: Gait normal.      Comments: Right side House-Brackmann grade 4    Psychiatric:         Mood and Affect: Mood normal.         Behavior: Behavior normal.         Judgment: Judgment normal.       Laura Hinton reports a pain score of 0.  Given her pain assessment as noted, treatment options  were discussed and the following options were decided upon as a follow-up plan to address the patient's pain: continuation of current treatment plan for pain.       Assessment / Plan      Assessment/Plan:   Laura Hinton is a 34-year-old female with a history of reported stage IV Hodgkin's lymphoma initially diagnosed and treated in 2009 with chemotherapy and radiotherapy with recurrence in 2013 with stem cell transplant at that time. 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 A positive margin was appreciated on pathology. ECOG 0     I once again discussed the rationale behind the delivery of external beam radiotherapy for patients with mucoepidermoid carcinoma of the parotid gland.  I explained that the positive margin appreciated on pathology may be artifactual and may not represent a parenchymal positive margin.  Nonetheless, radiotherapy could potentially decrease the likelihood of recurrence if the positive margin is true.  Radiotherapy could most certainly result in acute and chronic toxicities one of which is worsening of facial nerve dysfunction.  I discussed the potential for observation and Mrs. Hinton was agreeable to this management strategy.  I have ordered an MRI of the soft tissue neck for 1 month.  She will undergo evaluation by Dr. Albert in the interim.  She was encouraged to contact me with any questions or concerns regarding her care.    Zeb Lopez MD  Radiation Oncology  [facility]    This document has been signed by Zeb Lopez MD on July 7, 2025 10:06 EDT

## 2025-07-08 DIAGNOSIS — C07 MUCOEPIDERMOID CARCINOMA OF PAROTID GLAND: ICD-10-CM

## 2025-07-08 DIAGNOSIS — C07 CANCER OF PAROTID GLAND: Primary | ICD-10-CM

## 2025-07-09 ENCOUNTER — HOSPITAL ENCOUNTER (OUTPATIENT)
Dept: MRI IMAGING | Facility: HOSPITAL | Age: 35
Discharge: HOME | End: 2025-07-09
Admitting: RADIOLOGY
Payer: COMMERCIAL

## 2025-07-09 DIAGNOSIS — C07 MUCOEPIDERMOID CARCINOMA OF PAROTID GLAND: ICD-10-CM

## 2025-07-09 PROCEDURE — 70543 MRI ORBT/FAC/NCK W/O &W/DYE: CPT

## 2025-07-09 PROCEDURE — 25510000001 GADOPICLENOL 0.5 MMOL/ML SOLUTION: Performed by: RADIOLOGY

## 2025-07-09 PROCEDURE — A9573 GADOPICLENOL 0.5 MMOL/ML SOLUTION: HCPCS | Performed by: RADIOLOGY

## 2025-07-09 RX ADMIN — GADOPICLENOL 6 ML: 485.1 INJECTION INTRAVENOUS at 09:06

## 2025-08-11 ENCOUNTER — HOSPITAL ENCOUNTER (OUTPATIENT)
Dept: CT IMAGING | Facility: HOSPITAL | Age: 35
Discharge: HOME | End: 2025-08-11
Admitting: RADIOLOGY
Payer: COMMERCIAL

## 2025-08-11 DIAGNOSIS — C07 CANCER OF PAROTID GLAND: ICD-10-CM

## 2025-08-11 DIAGNOSIS — C07 MUCOEPIDERMOID CARCINOMA OF PAROTID GLAND: ICD-10-CM

## 2025-08-11 PROCEDURE — 25510000001 IOPAMIDOL PER 1 ML: Performed by: RADIOLOGY

## 2025-08-11 PROCEDURE — 71250 CT THORAX DX C-: CPT

## 2025-08-11 PROCEDURE — 70491 CT SOFT TISSUE NECK W/DYE: CPT

## 2025-08-11 RX ORDER — IOPAMIDOL 755 MG/ML
100 INJECTION, SOLUTION INTRAVASCULAR
Status: COMPLETED | OUTPATIENT
Start: 2025-08-11 | End: 2025-08-11

## 2025-08-11 RX ADMIN — IOPAMIDOL 100 ML: 755 INJECTION, SOLUTION INTRAVENOUS at 09:03

## 2025-08-18 ENCOUNTER — OFFICE VISIT (OUTPATIENT)
Dept: RADIATION ONCOLOGY | Facility: HOSPITAL | Age: 35
End: 2025-08-18
Payer: COMMERCIAL

## 2025-08-18 VITALS
WEIGHT: 138.3 LBS | DIASTOLIC BLOOD PRESSURE: 85 MMHG | HEART RATE: 88 BPM | SYSTOLIC BLOOD PRESSURE: 123 MMHG | BODY MASS INDEX: 25.3 KG/M2 | RESPIRATION RATE: 16 BRPM | OXYGEN SATURATION: 98 % | TEMPERATURE: 98.1 F

## 2025-08-18 DIAGNOSIS — C07 MUCOEPIDERMOID CARCINOMA OF PAROTID GLAND: Primary | ICD-10-CM

## 2025-08-18 PROCEDURE — G0463 HOSPITAL OUTPT CLINIC VISIT: HCPCS | Performed by: RADIOLOGY

## (undated) DEVICE — INTENDED FOR TISSUE SEPARATION, AND OTHER PROCEDURES THAT REQUIRE A SHARP SURGICAL BLADE TO PUNCTURE OR CUT.: Brand: BARD-PARKER ® CARBON RIB-BACK BLADES

## (undated) DEVICE — RETR RNG GEN2 14.1CMX14.1CM

## (undated) DEVICE — DRAPE,TOWEL,LARGE,INVISISHIELD: Brand: MEDLINE

## (undated) DEVICE — STRAP STIRUP SLP RNG 19X3.5IN DISP

## (undated) DEVICE — ELECTRD LP COVIDIEN LLETZ TUNG 10X10MM

## (undated) DEVICE — STANDARD HYPODERMIC NEEDLE,POLYPROPYLENE HUB: Brand: MONOJECT

## (undated) DEVICE — SUT VIC 4/0 P3 18IN J494G

## (undated) DEVICE — PREP TRAY WITH CHG: Brand: MEDLINE INDUSTRIES, INC.

## (undated) DEVICE — DRN WND JP RND W TROC SIL 10F 1/8IN

## (undated) DEVICE — VAGINAL PREP TRAY: Brand: MEDLINE INDUSTRIES, INC.

## (undated) DEVICE — GAUZE,SPONGE,4"X4",16PLY,XRAY,STRL,LF: Brand: MEDLINE

## (undated) DEVICE — SYR CONTRL LUERLOK 10CC

## (undated) DEVICE — PENCL SMOKE/EVAC MEGADYNE TELESCP 10FT

## (undated) DEVICE — TUBING, SUCTION, 1/4" X 10', STRAIGHT: Brand: MEDLINE

## (undated) DEVICE — GOWN,SIRUS,POLYRNF,BRTHSLV,XLN/XXL,18/CS: Brand: MEDLINE

## (undated) DEVICE — SUT VIC COAT 5/0 P3 18IN

## (undated) DEVICE — ENT-LF: Brand: MEDLINE INDUSTRIES, INC.

## (undated) DEVICE — PAD,SANITARY,11 IN,MAXI,N-STRL,IND WRAP: Brand: MEDLINE

## (undated) DEVICE — PENCL ES MEGADINE EZ/CLEAN BUTN W/HOLSTR 10FT

## (undated) DEVICE — NDL SPINE 22G 31/2IN BLK

## (undated) DEVICE — STERILE POLYISOPRENE POWDER-FREE SURGICAL GLOVES: Brand: PROTEXIS

## (undated) DEVICE — DRAPE,UNDERBUTTOCKS,PCH,STERILE: Brand: MEDLINE

## (undated) DEVICE — PACK,LITHOTOMY,PK I: Brand: MEDLINE

## (undated) DEVICE — GLV SURG BIOGEL LTX PF 7 1/2

## (undated) DEVICE — ELECTRODE 8227411 PAIRED 4 CH SET ROHS

## (undated) DEVICE — TOWEL,OR,DSP,ST,BLUE,STD,4/PK,20PK/CS: Brand: MEDLINE

## (undated) DEVICE — SUT VIC PLS UD BR COAT 4/0 PS4 18IN

## (undated) DEVICE — SUT PROLN 6/0 P1 18IN 8697G

## (undated) DEVICE — NEEDLE,18GX1.5",REG,BEVEL: Brand: MEDLINE

## (undated) DEVICE — SLV SCD KN/LEN ADJ EXPRSS BLENDED MD 1P/U

## (undated) DEVICE — SUT VIC 5/0 P3 18IN UD VCP493G

## (undated) DEVICE — MINOR-LF: Brand: MEDLINE INDUSTRIES, INC.

## (undated) DEVICE — ADAPT TBG PREFILTER 3/8TO1.33IN NS

## (undated) DEVICE — THE STERILE LIGHT HANDLE COVER IS USED WITH STERIS SURGICAL LIGHTING AND VISUALIZATION SYSTEMS.

## (undated) DEVICE — CATH URETH INTRMIT ALLPURP LTX 16F RED

## (undated) DEVICE — DRSNG TELFA PAD NONADH STR 1S 3X4IN

## (undated) DEVICE — RETR STAY HK ELAS SHRP 5MM PK/8

## (undated) DEVICE — PROBE 8225825X 3PK INCREMT STD PRASS TIP: Brand: NIM

## (undated) DEVICE — PAD GRND REM POLYHESIVE A/ DISP

## (undated) DEVICE — ELECTRD LP COVIDIEN LLETZ TUNG 12X20MM

## (undated) DEVICE — RESERVOIR,SUCTION,100CC,SILICONE: Brand: MEDLINE

## (undated) DEVICE — CAUTERY TIP POLISHER: Brand: DEVON

## (undated) DEVICE — PREFLTR SMOKE/EVAC SURGIFRESH PROTECTION PLS 1 1/3IN

## (undated) DEVICE — SUT NLY 2/0 664G

## (undated) DEVICE — PROCTO SWABS: Brand: DEROYAL

## (undated) DEVICE — SPONGE,DISSECTOR,ROUND CHERRY,XR,ST,5/PK: Brand: MEDLINE

## (undated) DEVICE — STERILE POLYISOPRENE POWDER-FREE SURGICAL GLOVES WITH EMOLLIENT COATING: Brand: PROTEXIS

## (undated) DEVICE — 1LYRTR 16FR10ML100%SIL UMS SNP: Brand: MEDLINE INDUSTRIES, INC.

## (undated) DEVICE — COAGULATOR SXN FTSWTCH 10F6IN